# Patient Record
Sex: FEMALE | Race: WHITE | NOT HISPANIC OR LATINO | Employment: STUDENT | URBAN - METROPOLITAN AREA
[De-identification: names, ages, dates, MRNs, and addresses within clinical notes are randomized per-mention and may not be internally consistent; named-entity substitution may affect disease eponyms.]

---
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
## 2023-07-18 ENCOUNTER — APPOINTMENT (OUTPATIENT)
Dept: LAB | Facility: HOSPITAL | Age: 18
End: 2023-07-18
Payer: COMMERCIAL

## 2023-07-18 DIAGNOSIS — Z01.84 IMMUNITY STATUS TESTING: Primary | ICD-10-CM

## 2023-07-18 PROCEDURE — 87521 HEPATITIS C PROBE&RVRS TRNSC: CPT

## 2023-07-18 PROCEDURE — 86765 RUBEOLA ANTIBODY: CPT

## 2023-07-18 PROCEDURE — 86735 MUMPS ANTIBODY: CPT

## 2023-07-18 PROCEDURE — 86480 TB TEST CELL IMMUN MEASURE: CPT

## 2023-07-18 PROCEDURE — 86706 HEP B SURFACE ANTIBODY: CPT

## 2023-07-18 PROCEDURE — 36415 COLL VENOUS BLD VENIPUNCTURE: CPT

## 2023-07-18 PROCEDURE — 86762 RUBELLA ANTIBODY: CPT

## 2023-07-18 PROCEDURE — 86787 VARICELLA-ZOSTER ANTIBODY: CPT

## 2023-07-18 PROCEDURE — 87522 HEPATITIS C REVRS TRNSCRPJ: CPT

## 2023-07-19 LAB
HBV SURFACE AB SER-ACNC: <3 MIU/ML
HCV RNA SERPL NAA+PROBE-ACNC: NOT DETECTED K[IU]/ML
MEV IGG SER IA-ACNC: 43.4 AU/ML
MUV IGG SER IA-ACNC: 83.5 AU/ML
RUBV IGG SERPL IA-ACNC: 5.39 INDEX
VZV IGG SER QL IA: ABNORMAL

## 2023-07-20 LAB
GAMMA INTERFERON BACKGROUND BLD IA-ACNC: 0.03 IU/ML
M TB IFN-G BLD-IMP: NEGATIVE
M TB IFN-G CD4+ BCKGRND COR BLD-ACNC: 0 IU/ML
M TB IFN-G CD4+ BCKGRND COR BLD-ACNC: 0 IU/ML
MITOGEN IGNF BCKGRD COR BLD-ACNC: >10 IU/ML

## 2023-09-14 ENCOUNTER — APPOINTMENT (EMERGENCY)
Dept: CT IMAGING | Facility: HOSPITAL | Age: 18
End: 2023-09-14
Payer: COMMERCIAL

## 2023-09-14 ENCOUNTER — HOSPITAL ENCOUNTER (EMERGENCY)
Facility: HOSPITAL | Age: 18
Discharge: HOME | End: 2023-09-14
Attending: EMERGENCY MEDICINE
Payer: COMMERCIAL

## 2023-09-14 VITALS
WEIGHT: 121.03 LBS | RESPIRATION RATE: 20 BRPM | DIASTOLIC BLOOD PRESSURE: 65 MMHG | TEMPERATURE: 97.4 F | SYSTOLIC BLOOD PRESSURE: 111 MMHG | HEART RATE: 90 BPM | OXYGEN SATURATION: 100 %

## 2023-09-14 DIAGNOSIS — K59.00 CONSTIPATION: ICD-10-CM

## 2023-09-14 DIAGNOSIS — R10.9 FLANK PAIN: Primary | ICD-10-CM

## 2023-09-14 LAB
ALBUMIN SERPL BCP-MCNC: 4.5 G/DL (ref 3.5–5)
ALP SERPL-CCNC: 42 U/L (ref 34–104)
ALT SERPL W P-5'-P-CCNC: 12 U/L (ref 7–52)
ANION GAP SERPL CALCULATED.3IONS-SCNC: 9 MMOL/L
AST SERPL W P-5'-P-CCNC: 14 U/L (ref 13–39)
BASOPHILS # BLD AUTO: 0.03 THOUSANDS/ÂΜL (ref 0–0.1)
BASOPHILS NFR BLD AUTO: 0 % (ref 0–1)
BILIRUB SERPL-MCNC: 0.31 MG/DL (ref 0.2–1)
BILIRUB UR QL STRIP: NEGATIVE
BUN SERPL-MCNC: 9 MG/DL (ref 5–25)
CALCIUM SERPL-MCNC: 9.3 MG/DL (ref 8.4–10.2)
CHLORIDE SERPL-SCNC: 104 MMOL/L (ref 96–108)
CLARITY UR: CLEAR
CO2 SERPL-SCNC: 25 MMOL/L (ref 21–32)
COLOR UR: YELLOW
CREAT SERPL-MCNC: 0.69 MG/DL (ref 0.6–1.3)
EOSINOPHIL # BLD AUTO: 0.08 THOUSAND/ÂΜL (ref 0–0.61)
EOSINOPHIL NFR BLD AUTO: 1 % (ref 0–6)
ERYTHROCYTE [DISTWIDTH] IN BLOOD BY AUTOMATED COUNT: 11.9 % (ref 11.6–15.1)
EXT PREGNANCY TEST URINE: NEGATIVE
EXT. CONTROL: NORMAL
GFR SERPL CREATININE-BSD FRML MDRD: 125 ML/MIN/1.73SQ M
GLUCOSE SERPL-MCNC: 84 MG/DL (ref 65–140)
GLUCOSE UR STRIP-MCNC: NEGATIVE MG/DL
HCT VFR BLD AUTO: 42.1 % (ref 34.8–46.1)
HGB BLD-MCNC: 13.8 G/DL (ref 11.5–15.4)
HGB UR QL STRIP.AUTO: NEGATIVE
IMM GRANULOCYTES # BLD AUTO: 0.01 THOUSAND/UL (ref 0–0.2)
IMM GRANULOCYTES NFR BLD AUTO: 0 % (ref 0–2)
KETONES UR STRIP-MCNC: NEGATIVE MG/DL
LEUKOCYTE ESTERASE UR QL STRIP: NEGATIVE
LYMPHOCYTES # BLD AUTO: 3.36 THOUSANDS/ÂΜL (ref 0.6–4.47)
LYMPHOCYTES NFR BLD AUTO: 41 % (ref 14–44)
MCH RBC QN AUTO: 29.6 PG (ref 26.8–34.3)
MCHC RBC AUTO-ENTMCNC: 32.8 G/DL (ref 31.4–37.4)
MCV RBC AUTO: 90 FL (ref 82–98)
MONOCYTES # BLD AUTO: 0.49 THOUSAND/ÂΜL (ref 0.17–1.22)
MONOCYTES NFR BLD AUTO: 6 % (ref 4–12)
NEUTROPHILS # BLD AUTO: 4.21 THOUSANDS/ÂΜL (ref 1.85–7.62)
NEUTS SEG NFR BLD AUTO: 52 % (ref 43–75)
NITRITE UR QL STRIP: NEGATIVE
NRBC BLD AUTO-RTO: 0 /100 WBCS
PH UR STRIP.AUTO: 7 [PH] (ref 4.5–8)
PLATELET # BLD AUTO: 260 THOUSANDS/UL (ref 149–390)
PMV BLD AUTO: 9.1 FL (ref 8.9–12.7)
POTASSIUM SERPL-SCNC: 3.6 MMOL/L (ref 3.5–5.3)
PROT SERPL-MCNC: 8 G/DL (ref 6.4–8.4)
PROT UR STRIP-MCNC: NEGATIVE MG/DL
RBC # BLD AUTO: 4.67 MILLION/UL (ref 3.81–5.12)
SODIUM SERPL-SCNC: 138 MMOL/L (ref 135–147)
SP GR UR STRIP.AUTO: 1.02 (ref 1–1.03)
UROBILINOGEN UR QL STRIP.AUTO: 0.2 E.U./DL
WBC # BLD AUTO: 8.18 THOUSAND/UL (ref 4.31–10.16)

## 2023-09-14 PROCEDURE — 85025 COMPLETE CBC W/AUTO DIFF WBC: CPT | Performed by: EMERGENCY MEDICINE

## 2023-09-14 PROCEDURE — 81003 URINALYSIS AUTO W/O SCOPE: CPT

## 2023-09-14 PROCEDURE — 36415 COLL VENOUS BLD VENIPUNCTURE: CPT | Performed by: EMERGENCY MEDICINE

## 2023-09-14 PROCEDURE — 99284 EMERGENCY DEPT VISIT MOD MDM: CPT

## 2023-09-14 PROCEDURE — 96361 HYDRATE IV INFUSION ADD-ON: CPT

## 2023-09-14 PROCEDURE — 96375 TX/PRO/DX INJ NEW DRUG ADDON: CPT

## 2023-09-14 PROCEDURE — 80053 COMPREHEN METABOLIC PANEL: CPT | Performed by: EMERGENCY MEDICINE

## 2023-09-14 PROCEDURE — 99285 EMERGENCY DEPT VISIT HI MDM: CPT | Performed by: EMERGENCY MEDICINE

## 2023-09-14 PROCEDURE — 74176 CT ABD & PELVIS W/O CONTRAST: CPT

## 2023-09-14 PROCEDURE — 96374 THER/PROPH/DIAG INJ IV PUSH: CPT

## 2023-09-14 PROCEDURE — 81025 URINE PREGNANCY TEST: CPT | Performed by: EMERGENCY MEDICINE

## 2023-09-14 RX ORDER — KETOROLAC TROMETHAMINE 30 MG/ML
30 INJECTION, SOLUTION INTRAMUSCULAR; INTRAVENOUS ONCE
Status: COMPLETED | OUTPATIENT
Start: 2023-09-14 | End: 2023-09-14

## 2023-09-14 RX ORDER — DIPHENHYDRAMINE HYDROCHLORIDE 50 MG/ML
25 INJECTION INTRAMUSCULAR; INTRAVENOUS ONCE
Status: COMPLETED | OUTPATIENT
Start: 2023-09-14 | End: 2023-09-14

## 2023-09-14 RX ORDER — METOCLOPRAMIDE HYDROCHLORIDE 5 MG/ML
10 INJECTION INTRAMUSCULAR; INTRAVENOUS ONCE
Status: COMPLETED | OUTPATIENT
Start: 2023-09-14 | End: 2023-09-14

## 2023-09-14 RX ORDER — FENTANYL CITRATE 50 UG/ML
50 INJECTION, SOLUTION INTRAMUSCULAR; INTRAVENOUS ONCE
Status: COMPLETED | OUTPATIENT
Start: 2023-09-14 | End: 2023-09-14

## 2023-09-14 RX ORDER — POLYETHYLENE GLYCOL 3350 17 G/17G
17 POWDER, FOR SOLUTION ORAL DAILY
Qty: 24 EACH | Refills: 0 | Status: SHIPPED | OUTPATIENT
Start: 2023-09-14

## 2023-09-14 RX ORDER — DOCUSATE SODIUM 100 MG/1
100 CAPSULE, LIQUID FILLED ORAL EVERY 12 HOURS
Qty: 60 CAPSULE | Refills: 0 | Status: SHIPPED | OUTPATIENT
Start: 2023-09-14

## 2023-09-14 RX ORDER — ONDANSETRON 2 MG/ML
4 INJECTION INTRAMUSCULAR; INTRAVENOUS ONCE
Status: COMPLETED | OUTPATIENT
Start: 2023-09-14 | End: 2023-09-14

## 2023-09-14 RX ADMIN — METOCLOPRAMIDE 10 MG: 5 INJECTION, SOLUTION INTRAMUSCULAR; INTRAVENOUS at 22:33

## 2023-09-14 RX ADMIN — FENTANYL CITRATE 50 MCG: 0.05 INJECTION, SOLUTION INTRAMUSCULAR; INTRAVENOUS at 22:36

## 2023-09-14 RX ADMIN — SODIUM CHLORIDE 1000 ML: 0.9 INJECTION, SOLUTION INTRAVENOUS at 20:21

## 2023-09-14 RX ADMIN — ONDANSETRON 4 MG: 2 INJECTION INTRAMUSCULAR; INTRAVENOUS at 20:23

## 2023-09-14 RX ADMIN — SODIUM CHLORIDE 1000 ML: 0.9 INJECTION, SOLUTION INTRAVENOUS at 22:29

## 2023-09-14 RX ADMIN — DIPHENHYDRAMINE HYDROCHLORIDE 25 MG: 50 INJECTION, SOLUTION INTRAMUSCULAR; INTRAVENOUS at 22:32

## 2023-09-14 RX ADMIN — KETOROLAC TROMETHAMINE 30 MG: 30 INJECTION, SOLUTION INTRAMUSCULAR; INTRAVENOUS at 20:18

## 2023-09-14 NOTE — ED PROVIDER NOTES
Pt Name: Blue Womack  MRN: [id number]  [address] 2005  Age/Sex: 19 y.o. female  Date of evaluation: 9/14/2023  PCP: Felisha Bennett MD    [address]    Chief Complaint   Patient presents with   • Flank Pain     Pt reports "I believe I'm having a kidney stone. My family has a history of them. I'm having sharp shooting pain in my lower left abdomen that radiates to my side, nausea, no vomiting and dizziness." Pt denies urinary symptoms         HPI    [facility] presents to the Emergency Department complaining of severe left flank pain. She has a strong family hx of kidney stones and believes she has one. She has had nausea but no vomiting. HPI      Past Medical and Surgical History    History reviewed. No pertinent past medical history. History reviewed. No pertinent surgical history. History reviewed. No pertinent family history. Social History     Tobacco Use   • Smoking status: Never   • Smokeless tobacco: Never   Vaping Use   • Vaping Use: Never used   Substance Use Topics   • Alcohol use: Never   • Drug use: Never         . Allergies    No Known Allergies    Home Medications    Prior to Admission medications    Medication Sig Start Date End Date Taking? Authorizing Provider   ondansetron (ZOFRAN) 4 mg tablet Take 1 tablet (4 mg total) by mouth every 6 (six) hours 8/20/20   Karey Padilla MD           Review of Systems    Review of Systems   Constitutional: Negative for chills and fever. HENT: Negative for ear pain and sore throat. Eyes: Negative for pain and visual disturbance. Respiratory: Negative for cough and shortness of breath. Cardiovascular: Negative for chest pain and palpitations. Gastrointestinal: Positive for abdominal pain and nausea. Negative for vomiting. Genitourinary: Positive for flank pain. Negative for dysuria and hematuria. Musculoskeletal: Negative for arthralgias and back pain. Skin: Negative for color change and rash.    Neurological: Negative for seizures and syncope. All other systems reviewed and are negative. Physical Exam      ED Triage Vitals   Temperature Pulse Respirations Blood Pressure SpO2   09/14/23 1930 09/14/23 1932 09/14/23 1932 09/14/23 1932 09/14/23 1932   (!) 97.4 °F (36.3 °C) (!) 111 20 140/75 99 %      Temp Source Heart Rate Source Patient Position - Orthostatic VS BP Location FiO2 (%)   09/14/23 1930 09/14/23 1932 09/14/23 1932 09/14/23 1932 --   Oral Monitor Sitting Right arm       Pain Score       09/14/23 2018       8               Physical Exam  Vitals and nursing note reviewed. Constitutional:       General: She is not in acute distress. Appearance: She is well-developed. HENT:      Head: Normocephalic and atraumatic. Eyes:      Conjunctiva/sclera: Conjunctivae normal.   Cardiovascular:      Rate and Rhythm: Normal rate and regular rhythm. Heart sounds: No murmur heard. Pulmonary:      Effort: Pulmonary effort is normal. No respiratory distress. Breath sounds: Normal breath sounds. Abdominal:      Palpations: Abdomen is soft. Tenderness: There is abdominal tenderness in the left lower quadrant. There is left CVA tenderness. Musculoskeletal:         General: No swelling. Cervical back: Neck supple. Skin:     General: Skin is warm and dry. Capillary Refill: Capillary refill takes less than 2 seconds. Neurological:      Mental Status: She is alert. Psychiatric:         Mood and Affect: Mood normal.       Assessment and Kuldipbetsy Resendiz is a 19 y.o. female who presents with left lower abdominal and flank pain with nausea. Physical examination remarkable for LLQ tenderness and left CVA tenderness. Differential diagnosis (not completely inclusive) includes renal colic, pyelo, diverticulitis, other. Plan will be to perform diagnostic testing and treat symptomatically.       MDM    Diagnostic Results      Labs:    Results for orders placed or performed during the hospital encounter of 09/14/23   CBC and differential   Result Value Ref Range    WBC 8.18 4.31 - 10.16 Thousand/uL    RBC 4.67 3.81 - 5.12 Million/uL    Hemoglobin 13.8 11.5 - 15.4 g/dL    Hematocrit 42.1 34.8 - 46.1 %    MCV 90 82 - 98 fL    MCH 29.6 26.8 - 34.3 pg    MCHC 32.8 31.4 - 37.4 g/dL    RDW 11.9 11.6 - 15.1 %    MPV 9.1 8.9 - 12.7 fL    Platelets 490 304 - 299 Thousands/uL    nRBC 0 /100 WBCs    Neutrophils Relative 52 43 - 75 %    Immat GRANS % 0 0 - 2 %    Lymphocytes Relative 41 14 - 44 %    Monocytes Relative 6 4 - 12 %    Eosinophils Relative 1 0 - 6 %    Basophils Relative 0 0 - 1 %    Neutrophils Absolute 4.21 1.85 - 7.62 Thousands/µL    Immature Grans Absolute 0.01 0.00 - 0.20 Thousand/uL    Lymphocytes Absolute 3.36 0.60 - 4.47 Thousands/µL    Monocytes Absolute 0.49 0.17 - 1.22 Thousand/µL    Eosinophils Absolute 0.08 0.00 - 0.61 Thousand/µL    Basophils Absolute 0.03 0.00 - 0.10 Thousands/µL   Comprehensive metabolic panel   Result Value Ref Range    Sodium 138 135 - 147 mmol/L    Potassium 3.6 3.5 - 5.3 mmol/L    Chloride 104 96 - 108 mmol/L    CO2 25 21 - 32 mmol/L    ANION GAP 9 mmol/L    BUN 9 5 - 25 mg/dL    Creatinine 0.69 0.60 - 1.30 mg/dL    Glucose 84 65 - 140 mg/dL    Calcium 9.3 8.4 - 10.2 mg/dL    AST 14 13 - 39 U/L    ALT 12 7 - 52 U/L    Alkaline Phosphatase 42 34 - 104 U/L    Total Protein 8.0 6.4 - 8.4 g/dL    Albumin 4.5 3.5 - 5.0 g/dL    Total Bilirubin 0.31 0.20 - 1.00 mg/dL    eGFR 125 ml/min/1.73sq m   POCT pregnancy, urine   Result Value Ref Range    EXT Preg Test, Ur Negative     Control Valid    Urine Macroscopic, POC   Result Value Ref Range    Color, UA Yellow     Clarity, UA Clear     pH, UA 7.0 4.5 - 8.0    Leukocytes, UA Negative Negative    Nitrite, UA Negative Negative    Protein, UA Negative Negative mg/dl    Glucose, UA Negative Negative mg/dl    Ketones, UA Negative Negative mg/dl    Urobilinogen, UA 0.2 0.2, 1.0 E.U./dl E.U./dl    Bilirubin, UA Negative Negative    Occult Blood, UA Negative Negative    Specific Gravity, UA 1.025 1.003 - 1.030       All labs reviewed and utilized in the medical decision making process    Radiology:    CT renal stone study abdomen pelvis wo contrast   Final Result         1. No evidence of nephrolithiasis or obstructive uropathy. 2. Findings suggestive of constipation. No evidence of bowel obstruction, colitis, diverticulitis or appendicitis            Workstation performed: [id number]             All radiology studies independently viewed by me and interpreted by the radiologist.    Procedure    Procedures      ED Course of Care and Re-Assessments    Patient was feeling significantly better at the time of discharge.         Medications   ketorolac (TORADOL) injection 30 mg (30 mg Intravenous Given 9/14/23 2018)   ondansetron (ZOFRAN) injection 4 mg (4 mg Intravenous Given 9/14/23 2023)   sodium chloride 0.9 % bolus 1,000 mL (0 mL Intravenous Stopped 9/14/23 2121)   metoclopramide (REGLAN) injection 10 mg (10 mg Intravenous Given 9/14/23 2233)   diphenhydrAMINE (BENADRYL) injection 25 mg (25 mg Intravenous Given 9/14/23 2232)   sodium chloride 0.9 % bolus 1,000 mL (0 mL Intravenous Stopped 9/14/23 2259)   fentanyl citrate (PF) 100 MCG/2ML 50 mcg (50 mcg Intravenous Given 9/14/23 2236)           FINAL IMPRESSION    Final diagnoses:   Flank pain   Constipation         DISPOSITION/PLAN    Time reflects when diagnosis was documented in both MDM as applicable and the Disposition within this note     Time User Action Codes Description Comment    9/14/2023 10:22 PM Sumit Norman L Add [R10.9] Flank pain     9/14/2023 10:22 PM Sumit Norman L Add [N20.0] Kidney stone     9/14/2023 11:04 PM Sumit Norman L Remove [N20.0] Kidney stone     9/14/2023 11:04 PM Sumit Norman Add [K59.00] Constipation       ED Disposition     ED Disposition   Discharge    Condition   Stable    Date/Time   Thu Sep 14, 2023 10:22 PM    Comment Yeny Bobo discharge to home/self care. Follow-up Information     Follow up With Specialties Details Why Contact Info Additional Information    Zuleyma Hurley MD Internal Medicine Schedule an appointment as soon as possible for a visit   [facility] [place] [address] [facility]. [address]  [address] [place] [address]  [phone]       [facility] Urology Schedule an appointment as soon as possible for a visit   [facility] [address] [id number] [address] [place] [facility], [address] [place] [address], [id number] [phone]            PATIENT REFERRED TO:    Zuleyma Hurley MD  [address] [place] [address]. [address]  [address] [facility] [address] [phone]    Schedule an appointment as soon as possible for a visit       [facility] [address] [address] [phone]  Schedule an appointment as soon as possible for a visit         DISCHARGE MEDICATIONS:    Discharge Medication List as of 9/14/2023 11:05 PM      START taking these medications    Details   docusate sodium (COLACE) 100 mg capsule Take 1 capsule (100 mg total) by mouth every 12 (twelve) hours, Starting Thu 9/14/2023, Normal      polyethylene glycol (MIRALAX) 17 g packet Take 17 g by mouth daily, Starting Thu 9/14/2023, Normal         CONTINUE these medications which have NOT CHANGED    Details   ondansetron (ZOFRAN) 4 mg tablet Take 1 tablet (4 mg total) by mouth every 6 (six) hours, Starting Thu 8/20/2020, Normal             No discharge procedures on file.          Ida Cuevas, [address],   09/15/23 2606

## 2024-02-21 PROBLEM — S09.90XA MILD HEAD INJURY DUE TO MOTOR VEHICLE ACCIDENT: Status: RESOLVED | Noted: 2020-08-20 | Resolved: 2024-02-21
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
## 2024-02-26 PROCEDURE — G0145 SCR C/V CYTO,THINLAYER,RESCR: HCPCS | Performed by: OBSTETRICS & GYNECOLOGY

## 2024-02-27 ENCOUNTER — LAB REQUISITION (OUTPATIENT)
Dept: LAB | Facility: HOSPITAL | Age: 19
End: 2024-02-27
Payer: COMMERCIAL

## 2024-02-27 DIAGNOSIS — Z01.419 ENCOUNTER FOR GYNECOLOGICAL EXAMINATION (GENERAL) (ROUTINE) WITHOUT ABNORMAL FINDINGS: ICD-10-CM

## 2024-02-27 DIAGNOSIS — Z12.4 ENCOUNTER FOR SCREENING FOR MALIGNANT NEOPLASM OF CERVIX: ICD-10-CM

## 2024-02-27 DIAGNOSIS — Z12.72 ENCOUNTER FOR SCREENING FOR MALIGNANT NEOPLASM OF VAGINA: ICD-10-CM

## 2024-03-01 LAB
LAB AP GYN PRIMARY INTERPRETATION: NORMAL
LAB AP LMP: NORMAL
Lab: NORMAL

## 2024-03-21 ENCOUNTER — OFFICE VISIT (OUTPATIENT)
Dept: INTERNAL MEDICINE CLINIC | Facility: OTHER | Age: 19
End: 2024-03-21
Payer: COMMERCIAL

## 2024-03-21 VITALS
OXYGEN SATURATION: 98 % | TEMPERATURE: 98.1 F | SYSTOLIC BLOOD PRESSURE: 108 MMHG | WEIGHT: 120 LBS | HEART RATE: 72 BPM | DIASTOLIC BLOOD PRESSURE: 64 MMHG | HEIGHT: 63 IN | BODY MASS INDEX: 21.26 KG/M2

## 2024-03-21 DIAGNOSIS — J45.20 MILD INTERMITTENT ASTHMA WITHOUT COMPLICATION: ICD-10-CM

## 2024-03-21 DIAGNOSIS — G43.009 MIGRAINE WITHOUT AURA AND WITHOUT STATUS MIGRAINOSUS, NOT INTRACTABLE: ICD-10-CM

## 2024-03-21 DIAGNOSIS — G89.29 CHRONIC NONINTRACTABLE HEADACHE, UNSPECIFIED HEADACHE TYPE: ICD-10-CM

## 2024-03-21 DIAGNOSIS — F41.9 ANXIETY: ICD-10-CM

## 2024-03-21 DIAGNOSIS — H61.23 IMPACTED CERUMEN OF BOTH EARS: ICD-10-CM

## 2024-03-21 DIAGNOSIS — R51.9 CHRONIC NONINTRACTABLE HEADACHE, UNSPECIFIED HEADACHE TYPE: ICD-10-CM

## 2024-03-21 DIAGNOSIS — Z13.29 SCREENING FOR THYROID DISORDER: ICD-10-CM

## 2024-03-21 DIAGNOSIS — L50.2 HIVES DUE TO HEAT EXPOSURE: Primary | ICD-10-CM

## 2024-03-21 DIAGNOSIS — Z13.0 SCREENING, ANEMIA, DEFICIENCY, IRON: ICD-10-CM

## 2024-03-21 DIAGNOSIS — Z76.89 ENCOUNTER TO ESTABLISH CARE: ICD-10-CM

## 2024-03-21 DIAGNOSIS — Z13.220 SCREENING FOR LIPID DISORDERS: ICD-10-CM

## 2024-03-21 DIAGNOSIS — Z13.1 SCREENING FOR DIABETES MELLITUS: ICD-10-CM

## 2024-03-21 PROBLEM — M25.569 KNEE PAIN: Status: ACTIVE | Noted: 2024-03-21

## 2024-03-21 PROBLEM — J45.909 ASTHMA: Status: ACTIVE | Noted: 2024-03-21

## 2024-03-21 PROCEDURE — 99204 OFFICE O/P NEW MOD 45 MIN: CPT | Performed by: NURSE PRACTITIONER

## 2024-03-21 RX ORDER — ALBUTEROL SULFATE 90 UG/1
2 AEROSOL, METERED RESPIRATORY (INHALATION) EVERY 6 HOURS PRN
COMMUNITY

## 2024-03-21 RX ORDER — NORETHINDRONE ACETATE AND ETHINYL ESTRADIOL 1; 20 MG/1; UG/1
1 TABLET ORAL DAILY
COMMUNITY
Start: 2024-03-05

## 2024-03-21 NOTE — PROGRESS NOTES
Assessment/Plan:    Problem List Items Addressed This Visit       Hives due to heat exposure - Primary     - start claritin 10mg daily  - follow up with allergist          Relevant Orders    Ambulatory Referral to Allergy    Asthma     - controlled with albuterol prn. Uses 15 min prior to exercise         Relevant Medications    albuterol (PROVENTIL HFA,VENTOLIN HFA) 90 mcg/act inhaler    Anxiety     - previously on lexapro and buspar. Currently controlled without medication          Migraine without aura and without status migrainosus, not intractable     - occurring about once a week  - no hx of prescription medication. Continue tylenol/ibuprofen prn  - keep headache diary.   - drink 60-80oz of water a day  - check labs as ordered  - follow up 1 month         Chronic nonintractable headache     - reports daily headaches for as long as she can remember  - keep headache diary  - recommend clean, healthy diet. Avoid processed foods, artificial sweeteners etc.  - increase water to 60-80oz daily  - check labs as ordered  - follow up 1 month         Encounter to establish care     Medical hx reviewed with patient  See assessment/plan  Check labs as ordered  Follow up 1 month          Other Visit Diagnoses       Screening, anemia, deficiency, iron        Relevant Orders    CBC and differential    Screening for diabetes mellitus        Relevant Orders    Comprehensive metabolic panel    Screening for lipid disorders        Relevant Orders    Lipid Panel with Direct LDL reflex    Screening for thyroid disorder        Relevant Orders    TSH, 3rd generation with Free T4 reflex    Impacted cerumen of both ears        start debrox  follow up for lavage            BMI Counseling: Body mass index is 21.26 kg/m².          M*MeetDoctor software was used to dictate this note.  It may contain errors with dictating incorrect words or incorrect spelling. Please contact the provider directly with any questions.    Subjective:      Patient  ID: Anjelica Berger is a 19 y.o. female.    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 She was on lexapro 10mg and buspar 15mg. She stopped these medications about 9 months ago. She feels her anxiety is controlled for the most part, she has occasional flares of anxiety.     Asthma - exercise induced asthma, diagnosed in 2022 by her family doctor. She was on albuterol as needed. She uses her albuterol 15 minutes before exercise. She does note she has infrequent asthma attacks at other times as well, heart racing, chest tightness, SOB. The albuterol improves her symptoms.   She estimates this happens every other month.     Migraines - she has been having migraines in her carter yr of highschool. She has migraines about once a week, depending on her stress. She was never on a prescription medication for migraines    Headaches - she reports daily headaches in the front and back of her head. She states she has had them for as long as she can remember. Feels like a pressure, other times pounding. Occasional nausea and dizziness. She has symptoms all day. She will use tylenol which does alleviate the headaches.     Heat hives - she develops hives when she gets hot. Anytime she feels herself getting hot she will start to get red and blotchy and as she has gotten older she will now get full blown hives. She gets it when she exercises.     She has a hx of a kidney stone. She thinks she has passed it.     Knee pain - worse when going up stairs. She has swelling in her left knee after doing the stair master.  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   She usually exercises 3-4 times a week.       The following portions of the patient's history were reviewed and updated as appropriate: allergies, current  "medications, past family history, past medical history, past social history, past surgical history, and problem list.    Review of Systems   Constitutional:  Negative for activity change, appetite change, chills and fever.   HENT:  Negative for congestion, ear pain, postnasal drip, rhinorrhea, sinus pressure, sinus pain, sneezing and sore throat.    Eyes:  Negative for discharge and itching.   Respiratory:  Negative for cough, chest tightness, shortness of breath and wheezing.    Endocrine: Positive for heat intolerance.   Musculoskeletal:  Positive for arthralgias (left knee).   Skin:         hives   Neurological:  Positive for dizziness and headaches.         Past Medical History:   Diagnosis Date    Allergic     Seasonal    Anxiety     Asthma          Current Outpatient Medications:     albuterol (PROVENTIL HFA,VENTOLIN HFA) 90 mcg/act inhaler, Inhale 2 puffs every 6 (six) hours as needed for wheezing, Disp: , Rfl:     Junel 1/20 1-20 MG-MCG per tablet, Take 1 tablet by mouth daily, Disp: , Rfl:     No Known Allergies    Social History   History reviewed. No pertinent surgical history.  Family History   Problem Relation Age of Onset    Arthritis Mother         Diagnosed at 20    Migraines Mother     Hyperparathyroidism Father     Nephrolithiasis Father     Asthma Maternal Grandmother     Breast cancer Maternal Grandmother     Asthma Paternal Grandmother     Autoimmune disease Paternal Grandmother     Sjogren's syndrome Paternal Grandmother     Diabetes Paternal Aunt         Pre-diabetic       Objective:  /64 (BP Location: Left arm, Patient Position: Sitting, Cuff Size: Standard)   Pulse 72   Temp 98.1 °F (36.7 °C) (Temporal)   Ht 5' 3\" (1.6 m)   Wt 54.4 kg (120 lb)   SpO2 98%   BMI 21.26 kg/m²      Physical Exam  Vitals reviewed.   Constitutional:       General: She is not in acute distress.     Appearance: Normal appearance. She is normal weight. She is not diaphoretic.   HENT:      Head: " Normocephalic and atraumatic.      Right Ear: Tympanic membrane and external ear normal. There is impacted cerumen.      Left Ear: Tympanic membrane and external ear normal. There is impacted cerumen.      Nose: Nose normal.      Mouth/Throat:      Mouth: Mucous membranes are moist.      Pharynx: Oropharynx is clear. No oropharyngeal exudate or posterior oropharyngeal erythema.   Eyes:      Extraocular Movements: Extraocular movements intact.      Conjunctiva/sclera: Conjunctivae normal.      Pupils: Pupils are equal, round, and reactive to light.   Cardiovascular:      Rate and Rhythm: Normal rate and regular rhythm.      Heart sounds: Normal heart sounds. No murmur heard.  Pulmonary:      Effort: Pulmonary effort is normal. No respiratory distress.      Breath sounds: Normal breath sounds. No wheezing, rhonchi or rales.   Lymphadenopathy:      Upper Body:      Right upper body: No supraclavicular, axillary, pectoral or epitrochlear adenopathy.      Left upper body: No supraclavicular, axillary, pectoral or epitrochlear adenopathy.   Skin:     Findings: Rash (flat red blotches on neck and chest during visit) present.   Neurological:      Mental Status: She is alert and oriented to person, place, and time. Mental status is at baseline.   Psychiatric:         Mood and Affect: Mood normal.         Behavior: Behavior normal.         Thought Content: Thought content normal.         Judgment: Judgment normal.

## 2024-03-21 NOTE — ASSESSMENT & PLAN NOTE
- occurring about once a week  - no hx of prescription medication. Continue tylenol/ibuprofen prn  - keep headache diary.   - drink 60-80oz of water a day  - check labs as ordered  - follow up 1 month

## 2024-03-21 NOTE — ASSESSMENT & PLAN NOTE
- reports daily headaches for as long as she can remember  - keep headache diary  - recommend clean, healthy diet. Avoid processed foods, artificial sweeteners etc.  - increase water to 60-80oz daily  - check labs as ordered  - follow up 1 month

## 2024-03-21 NOTE — PATIENT INSTRUCTIONS
Start claritin 10mg daily     Go for labs fasting     Schedule appt with allergist    Start debrox drops into each ear, 5 drops per ear for 5 days before next visit     Drink 60-90 oz of water daily  Monitor diet.

## 2024-03-25 ENCOUNTER — PATIENT MESSAGE (OUTPATIENT)
Dept: INTERNAL MEDICINE CLINIC | Facility: OTHER | Age: 19
End: 2024-03-25

## 2024-03-26 ENCOUNTER — APPOINTMENT (OUTPATIENT)
Dept: LAB | Age: 19
End: 2024-03-26
Payer: COMMERCIAL

## 2024-03-26 DIAGNOSIS — Z13.0 SCREENING, ANEMIA, DEFICIENCY, IRON: ICD-10-CM

## 2024-03-26 DIAGNOSIS — Z13.220 SCREENING FOR LIPID DISORDERS: ICD-10-CM

## 2024-03-26 DIAGNOSIS — Z13.29 SCREENING FOR THYROID DISORDER: ICD-10-CM

## 2024-03-26 DIAGNOSIS — Z13.1 SCREENING FOR DIABETES MELLITUS: ICD-10-CM

## 2024-03-26 LAB
ALBUMIN SERPL BCP-MCNC: 4.2 G/DL (ref 3.5–5)
ALP SERPL-CCNC: 39 U/L (ref 34–104)
ALT SERPL W P-5'-P-CCNC: 10 U/L (ref 7–52)
ANION GAP SERPL CALCULATED.3IONS-SCNC: 8 MMOL/L (ref 4–13)
AST SERPL W P-5'-P-CCNC: 16 U/L (ref 13–39)
BASOPHILS # BLD AUTO: 0.01 THOUSANDS/ÂΜL (ref 0–0.1)
BASOPHILS NFR BLD AUTO: 0 % (ref 0–1)
BILIRUB SERPL-MCNC: 0.42 MG/DL (ref 0.2–1)
BUN SERPL-MCNC: 8 MG/DL (ref 5–25)
CALCIUM SERPL-MCNC: 9.1 MG/DL (ref 8.4–10.2)
CHLORIDE SERPL-SCNC: 104 MMOL/L (ref 96–108)
CHOLEST SERPL-MCNC: 169 MG/DL
CO2 SERPL-SCNC: 27 MMOL/L (ref 21–32)
CREAT SERPL-MCNC: 0.75 MG/DL (ref 0.6–1.3)
EOSINOPHIL # BLD AUTO: 0.04 THOUSAND/ÂΜL (ref 0–0.61)
EOSINOPHIL NFR BLD AUTO: 1 % (ref 0–6)
ERYTHROCYTE [DISTWIDTH] IN BLOOD BY AUTOMATED COUNT: 11.9 % (ref 11.6–15.1)
GFR SERPL CREATININE-BSD FRML MDRD: 114 ML/MIN/1.73SQ M
GLUCOSE P FAST SERPL-MCNC: 75 MG/DL (ref 65–99)
HCT VFR BLD AUTO: 41 % (ref 34.8–46.1)
HDLC SERPL-MCNC: 59 MG/DL
HGB BLD-MCNC: 13.3 G/DL (ref 11.5–15.4)
IMM GRANULOCYTES # BLD AUTO: 0 THOUSAND/UL (ref 0–0.2)
IMM GRANULOCYTES NFR BLD AUTO: 0 % (ref 0–2)
LDLC SERPL CALC-MCNC: 93 MG/DL (ref 0–100)
LYMPHOCYTES # BLD AUTO: 2.19 THOUSANDS/ÂΜL (ref 0.6–4.47)
LYMPHOCYTES NFR BLD AUTO: 44 % (ref 14–44)
MCH RBC QN AUTO: 29.6 PG (ref 26.8–34.3)
MCHC RBC AUTO-ENTMCNC: 32.4 G/DL (ref 31.4–37.4)
MCV RBC AUTO: 91 FL (ref 82–98)
MONOCYTES # BLD AUTO: 0.4 THOUSAND/ÂΜL (ref 0.17–1.22)
MONOCYTES NFR BLD AUTO: 8 % (ref 4–12)
NEUTROPHILS # BLD AUTO: 2.38 THOUSANDS/ÂΜL (ref 1.85–7.62)
NEUTS SEG NFR BLD AUTO: 47 % (ref 43–75)
NRBC BLD AUTO-RTO: 0 /100 WBCS
PLATELET # BLD AUTO: 216 THOUSANDS/UL (ref 149–390)
PMV BLD AUTO: 9.7 FL (ref 8.9–12.7)
POTASSIUM SERPL-SCNC: 3.8 MMOL/L (ref 3.5–5.3)
PROT SERPL-MCNC: 6.9 G/DL (ref 6.4–8.4)
RBC # BLD AUTO: 4.5 MILLION/UL (ref 3.81–5.12)
SODIUM SERPL-SCNC: 139 MMOL/L (ref 135–147)
TRIGL SERPL-MCNC: 86 MG/DL
TSH SERPL DL<=0.05 MIU/L-ACNC: 1.62 UIU/ML (ref 0.45–4.5)
WBC # BLD AUTO: 5.02 THOUSAND/UL (ref 4.31–10.16)

## 2024-03-26 PROCEDURE — 36415 COLL VENOUS BLD VENIPUNCTURE: CPT

## 2024-03-26 PROCEDURE — 84443 ASSAY THYROID STIM HORMONE: CPT

## 2024-03-26 PROCEDURE — 85025 COMPLETE CBC W/AUTO DIFF WBC: CPT

## 2024-03-26 PROCEDURE — 80061 LIPID PANEL: CPT

## 2024-03-26 PROCEDURE — 80053 COMPREHEN METABOLIC PANEL: CPT

## 2024-04-10 ENCOUNTER — RA CDI HCC (OUTPATIENT)
Dept: OTHER | Facility: HOSPITAL | Age: 19
End: 2024-04-10

## 2024-04-19 ENCOUNTER — OFFICE VISIT (OUTPATIENT)
Dept: INTERNAL MEDICINE CLINIC | Facility: OTHER | Age: 19
End: 2024-04-19
Payer: COMMERCIAL

## 2024-04-19 VITALS
HEIGHT: 63 IN | DIASTOLIC BLOOD PRESSURE: 70 MMHG | HEART RATE: 103 BPM | OXYGEN SATURATION: 99 % | BODY MASS INDEX: 23 KG/M2 | SYSTOLIC BLOOD PRESSURE: 118 MMHG | TEMPERATURE: 98.8 F | WEIGHT: 129.8 LBS

## 2024-04-19 DIAGNOSIS — F41.9 ANXIETY: Primary | ICD-10-CM

## 2024-04-19 DIAGNOSIS — L50.2 HIVES DUE TO HEAT EXPOSURE: ICD-10-CM

## 2024-04-19 DIAGNOSIS — F41.0 PANIC ATTACKS: ICD-10-CM

## 2024-04-19 DIAGNOSIS — G43.009 MIGRAINE WITHOUT AURA AND WITHOUT STATUS MIGRAINOSUS, NOT INTRACTABLE: ICD-10-CM

## 2024-04-19 PROBLEM — Z76.89 ENCOUNTER TO ESTABLISH CARE: Status: RESOLVED | Noted: 2024-03-21 | Resolved: 2024-04-19

## 2024-04-19 PROCEDURE — 99214 OFFICE O/P EST MOD 30 MIN: CPT | Performed by: NURSE PRACTITIONER

## 2024-04-19 RX ORDER — METHYLPREDNISOLONE 4 MG/1
TABLET ORAL
Qty: 21 EACH | Refills: 0 | Status: SHIPPED | OUTPATIENT
Start: 2024-04-19

## 2024-04-19 RX ORDER — ZOLMITRIPTAN 2.5 MG/1
2.5 TABLET, FILM COATED ORAL ONCE AS NEEDED
Qty: 10 TABLET | Refills: 1 | Status: SHIPPED | OUTPATIENT
Start: 2024-04-19

## 2024-04-19 RX ORDER — HYDROXYZINE HYDROCHLORIDE 10 MG/1
10 TABLET, FILM COATED ORAL EVERY 6 HOURS PRN
Qty: 30 TABLET | Refills: 1 | Status: SHIPPED | OUTPATIENT
Start: 2024-04-19

## 2024-04-19 RX ORDER — VENLAFAXINE HYDROCHLORIDE 37.5 MG/1
37.5 CAPSULE, EXTENDED RELEASE ORAL DAILY
Qty: 30 CAPSULE | Refills: 1 | Status: SHIPPED | OUTPATIENT
Start: 2024-04-19

## 2024-04-19 RX ORDER — LORATADINE 10 MG/1
TABLET ORAL
COMMUNITY
Start: 2024-03-21

## 2024-04-19 NOTE — PATIENT INSTRUCTIONS
Start medrol dose chaparro and take as directed   After completed, then start effexor once daily

## 2024-04-19 NOTE — ASSESSMENT & PLAN NOTE
- start hydroxyzine 10mg prn  - she was previously on hydroxyzine 25mg which worked well for her panic, but made her extremely tired.

## 2024-04-19 NOTE — ASSESSMENT & PLAN NOTE
- start medrol dose chaparro for acute migraine   - once completed, start Effexor XR 37.5mg daily   - zomig 2.5mg prn at onset of migraines  - follow up in 6 weeks

## 2024-04-19 NOTE — PROGRESS NOTES
Assessment/Plan:    Problem List Items Addressed This Visit       Hives due to heat exposure     - improved with claritin 10mg daily  - scheduled with allergist on 5/6         Anxiety - Primary     - start Effexor 37.5mg daily  - hydroxyzine 10mg prn   - follow up 6 weeks         Relevant Medications    venlafaxine (EFFEXOR-XR) 37.5 mg 24 hr capsule    Migraine without aura and without status migrainosus, not intractable     - start medrol dose chaparro for acute migraine   - once completed, start Effexor XR 37.5mg daily   - zomig 2.5mg prn at onset of migraines  - follow up in 6 weeks          Relevant Medications    venlafaxine (EFFEXOR-XR) 37.5 mg 24 hr capsule    ZOLMitriptan (ZOMIG) 2.5 MG tablet    methylPREDNISolone 4 MG tablet therapy pack    Panic attacks     - start hydroxyzine 10mg prn  - she was previously on hydroxyzine 25mg which worked well for her panic, but made her extremely tired.          Relevant Medications    hydrOXYzine HCL (ATARAX) 10 mg tablet       BMI Counseling: Body mass index is 22.99 kg/m².        [employer] software was used to dictate this note.  It may contain errors with dictating incorrect words or incorrect spelling. Please contact the provider directly with any questions.    Subjective:      Patient ID: Anjelica Berger is a 19 y.o. female.    HPI    Patient presents today for routine 1 month follow up    Hives - she is scheduled with an allergist on 5/6. She states that since starting the Claritin she has not had the hives, but she does continue to have red spots on her skin. She also feels she has been sweating more.     Migraines - she has chronic daily headaches and also have migraines. Since our last visit her symptoms have been unchanged.   This week (2 days ago) she was at work and started with hot flashes and mild dizziness. They checked her BS which was normal 89. Then her BP was elevated 146/93. She then started to develop left sided facial pain, nausea, room started spinning.  She felt tunnel vision starting and then blacked out. She thinks she may have lost consciousness for brief period. She denies any head trauma. She was put in a wheelchair. They put ice on the back of her neck and gave her water. She went home and went to sleep. She slept for about 4 hours and when she woke up she did feel out of it. The head pain was improved, nausea was improved. Yesterday at work she had some nausea. She continues with a headache on the left side, constant about 6/10. Gradually improving throughout the week. Vision is normal. Nausea is improved.     She states she get migraines a lot with changes in pressure in the weather.     Anxiety- she is having anxiety on a daily basis. With her anxiety she usually has nausea or diarrhea. Work has been more stressful lately.   She was previously lexapro and buspirone. She felt these worked well for her.   She was also on hydroxyzine prn panic attacks.     The following portions of the patient's history were reviewed and updated as appropriate: allergies, current medications, past family history, past medical history, past social history, past surgical history, and problem list.    Review of Systems   Eyes:  Positive for visual disturbance (resolved).   Respiratory:  Negative for cough, chest tightness, shortness of breath and wheezing.    Gastrointestinal:  Positive for nausea (resolved).   Neurological:  Positive for dizziness (resolved) and headaches.   Psychiatric/Behavioral:  The patient is nervous/anxious.    All other systems reviewed and are negative.        Past Medical History:   Diagnosis Date    Allergic     Seasonal    Anxiety     Asthma          Current Outpatient Medications:     albuterol (PROVENTIL HFA,VENTOLIN HFA) 90 mcg/act inhaler, Inhale 2 puffs every 6 (six) hours as needed for wheezing, Disp: , Rfl:     hydrOXYzine HCL (ATARAX) 10 mg tablet, Take 1 tablet (10 mg total) by mouth every 6 (six) hours as needed for anxiety, Disp: 30  "tablet, Rfl: 1    Junel 1/20 1-20 MG-MCG per tablet, Take 1 tablet by mouth daily, Disp: , Rfl:     loratadine (Claritin) 10 mg tablet, , Disp: , Rfl:     methylPREDNISolone 4 MG tablet therapy pack, Use as directed on package, Disp: 21 each, Rfl: 0    venlafaxine (EFFEXOR-XR) 37.5 mg 24 hr capsule, Take 1 capsule (37.5 mg total) by mouth daily, Disp: 30 capsule, Rfl: 1    ZOLMitriptan (ZOMIG) 2.5 MG tablet, Take 1 tablet (2.5 mg total) by mouth once as needed for migraine may repeat after 2 hours, max 5mg per 24 hours, Disp: 10 tablet, Rfl: 1    No Known Allergies    Social History   History reviewed. No pertinent surgical history.  Family History   Problem Relation Age of Onset    Arthritis Mother         Diagnosed at 20    Migraines Mother     Hyperparathyroidism Father     Nephrolithiasis Father     Asthma Maternal Grandmother     Breast cancer Maternal Grandmother     Sjogren's syndrome Maternal Grandmother     Von Willebrand disease Maternal Grandmother     Asthma Paternal Grandmother     Autoimmune disease Paternal Grandmother     Sjogren's syndrome Paternal Grandmother     Fibromyalgia Paternal Grandmother     Diabetes Paternal Aunt         Pre-diabetic       Objective:  /70 (BP Location: Left arm, Patient Position: Sitting, Cuff Size: Adult)   Pulse 103   Temp 98.8 °F (37.1 °C) (Temporal)   Ht 5' 3\" (1.6 m)   Wt 58.9 kg (129 lb 12.8 oz) Comment: with shoes on  SpO2 99% Comment: ra  BMI 22.99 kg/m²      Physical Exam  Vitals reviewed.   Constitutional:       General: She is not in acute distress.     Appearance: Normal appearance. She is well-developed and normal weight. She is not diaphoretic.   HENT:      Head: Normocephalic and atraumatic.   Eyes:      Extraocular Movements: Extraocular movements intact.      Conjunctiva/sclera: Conjunctivae normal.      Pupils: Pupils are equal, round, and reactive to light.   Cardiovascular:      Rate and Rhythm: Normal rate and regular rhythm.      Heart " sounds: Normal heart sounds. No murmur heard.  Pulmonary:      Effort: Pulmonary effort is normal. No respiratory distress.      Breath sounds: Normal breath sounds. No decreased breath sounds, wheezing or rhonchi.   Musculoskeletal:      Cervical back: Normal range of motion and neck supple.   Lymphadenopathy:      Cervical: No cervical adenopathy.   Skin:     General: Skin is warm and dry.   Neurological:      Mental Status: She is alert and oriented to person, place, and time. Mental status is at baseline.      Cranial Nerves: No cranial nerve deficit, dysarthria or facial asymmetry.      Motor: No weakness, abnormal muscle tone or pronator drift.      Coordination: Coordination is intact. Romberg sign negative. Coordination normal. Finger-Nose-Finger Test and Heel to Shin Test normal. Rapid alternating movements normal.      Gait: Gait is intact. Gait and tandem walk normal.   Psychiatric:         Mood and Affect: Mood normal.         Behavior: Behavior normal.         Thought Content: Thought content normal.         Judgment: Judgment normal.

## 2024-05-11 DIAGNOSIS — F41.9 ANXIETY: ICD-10-CM

## 2024-05-11 DIAGNOSIS — G43.009 MIGRAINE WITHOUT AURA AND WITHOUT STATUS MIGRAINOSUS, NOT INTRACTABLE: ICD-10-CM

## 2024-05-12 RX ORDER — VENLAFAXINE HYDROCHLORIDE 37.5 MG/1
37.5 CAPSULE, EXTENDED RELEASE ORAL DAILY
Qty: 90 CAPSULE | Refills: 1 | Status: SHIPPED | OUTPATIENT
Start: 2024-05-12

## 2024-05-31 ENCOUNTER — OFFICE VISIT (OUTPATIENT)
Dept: INTERNAL MEDICINE CLINIC | Facility: OTHER | Age: 19
End: 2024-05-31
Payer: COMMERCIAL

## 2024-05-31 VITALS
SYSTOLIC BLOOD PRESSURE: 118 MMHG | DIASTOLIC BLOOD PRESSURE: 70 MMHG | OXYGEN SATURATION: 97 % | WEIGHT: 126.8 LBS | HEIGHT: 63 IN | TEMPERATURE: 98.5 F | BODY MASS INDEX: 22.47 KG/M2 | HEART RATE: 94 BPM

## 2024-05-31 DIAGNOSIS — L50.2 HIVES DUE TO HEAT EXPOSURE: ICD-10-CM

## 2024-05-31 DIAGNOSIS — F41.0 PANIC ATTACKS: ICD-10-CM

## 2024-05-31 DIAGNOSIS — F41.9 ANXIETY: ICD-10-CM

## 2024-05-31 DIAGNOSIS — H00.012 HORDEOLUM EXTERNUM OF RIGHT LOWER EYELID: Primary | ICD-10-CM

## 2024-05-31 DIAGNOSIS — G43.009 MIGRAINE WITHOUT AURA AND WITHOUT STATUS MIGRAINOSUS, NOT INTRACTABLE: ICD-10-CM

## 2024-05-31 PROCEDURE — 99214 OFFICE O/P EST MOD 30 MIN: CPT | Performed by: NURSE PRACTITIONER

## 2024-05-31 RX ORDER — ERYTHROMYCIN 5 MG/G
0.5 OINTMENT OPHTHALMIC
Qty: 5 G | Refills: 0 | Status: SHIPPED | OUTPATIENT
Start: 2024-05-31 | End: 2024-06-05

## 2024-05-31 RX ORDER — CETIRIZINE HYDROCHLORIDE 10 MG/1
10 TABLET ORAL AS NEEDED
COMMUNITY
End: 2024-05-31

## 2024-05-31 NOTE — PATIENT INSTRUCTIONS
Start erythromycin ointment into right eye before bed for 5 days   Warm compresses to eye 3-4 times a day

## 2024-05-31 NOTE — PROGRESS NOTES
Assessment/Plan:    Problem List Items Addressed This Visit       Hives due to heat exposure     -Patient was seen and evaluated by allergist .  Switched from Claritin to Zyrtec but has not noticed any improvement in symptoms.  Symptoms were stable on Claritin 10 mg daily which made her less tired.  She was unhappy with this provider but does have her referral to Dr Yates who she plans to follow up with  - continue claritin 10mg daily          Anxiety     -Controlled with Effexor XR 37.5 mg daily         Migraine without aura and without status migrainosus, not intractable     Significantly improved with Effexor  -Continue Effexor XR 37.5 mg daily  -Continue zolmitriptan 2.5 mg as needed         Panic attacks     -Anxiety improved and controlled with Effexor has not needed to use hydroxyzine,           Other Visit Diagnoses       Hordeolum externum of right lower eyelid    -  Primary    start warm compresses 3-4x daily  start erythromycin ointment qhs x 5 days    Relevant Medications    erythromycin (ILOTYCIN) ophthalmic ointment            BMI Counseling: Body mass index is 22.46 kg/m².     [employer] software was used to dictate this note.  It may contain errors with dictating incorrect words or incorrect spelling. Please contact the provider directly with any questions.    Subjective:      Patient ID: Anjelica Berger is a 19 y.o. female.      [facility]    Hives due to heat-she was seen by allergist Dr Garay.  She was not pleased with his bedside manner.  He recommended she stop Claritin 10 mg daily and switch to Zyrtec 2 tablets in the morning and 2 tablets before bed, she tells me that the Zyrtec makes her much more tired and she has not noticed any change in her symptoms.  She continues to feel her symptoms are overall stable on the Claritin.     Migraines - significantly improved on the Effexor 37.5mg daily. She's only had  1 migraine since starting it and it  was mild, resolved with zomig and Excedrin.     Anxiety-significantly improved with Effexor 37.5 mg daily.  She has not yet needed to use the hydroxyzine for panic attacks since starting the Effexor.     She notes that she has a small swollen area under her right eye that is tender.           The following portions of the patient's history were reviewed and updated as appropriate: allergies, current medications, past family history, past medical history, past social history, past surgical history, and problem list.    Review of Systems   Constitutional:  Negative for chills and fever.   Eyes:  Negative for visual disturbance.   Skin:  Positive for rash.   Neurological:  Positive for headaches (improved).   Psychiatric/Behavioral:  The patient is nervous/anxious (improved).          Past Medical History:   Diagnosis Date    Allergic     Seasonal    Anxiety     Asthma          Current Outpatient Medications:     albuterol (PROVENTIL HFA,VENTOLIN HFA) 90 mcg/act inhaler, Inhale 2 puffs every 6 (six) hours as needed for wheezing, Disp: , Rfl:     erythromycin (ILOTYCIN) ophthalmic ointment, Administer 0.5 inches to the right eye daily at bedtime for 5 days, Disp: 5 g, Rfl: 0    hydrOXYzine HCL (ATARAX) 10 mg tablet, Take 1 tablet (10 mg total) by mouth every 6 (six) hours as needed for anxiety, Disp: 30 tablet, Rfl: 1    Junel 1/20 1-20 MG-MCG per tablet, Take 1 tablet by mouth daily, Disp: , Rfl:     loratadine (Claritin) 10 mg tablet, , Disp: , Rfl:     venlafaxine (EFFEXOR-XR) 37.5 mg 24 hr capsule, TAKE 1 CAPSULE BY MOUTH EVERY DAY, Disp: 90 capsule, Rfl: 1    ZOLMitriptan (ZOMIG) 2.5 MG tablet, Take 1 tablet (2.5 mg total) by mouth once as needed for migraine may repeat after 2 hours, max 5mg per 24 hours, Disp: 10 tablet, Rfl: 1    No Known Allergies    Social History   History reviewed. No pertinent surgical history.  Family History   Problem Relation Age of Onset    Arthritis Mother         Diagnosed at 20     "Migraines Mother     Hyperparathyroidism Father     Nephrolithiasis Father     Asthma Maternal Grandmother     Breast cancer Maternal Grandmother     Sjogren's syndrome Maternal Grandmother     Von Willebrand disease Maternal Grandmother     Asthma Paternal Grandmother     Autoimmune disease Paternal Grandmother     Sjogren's syndrome Paternal Grandmother     Fibromyalgia Paternal Grandmother     Diabetes Paternal Aunt         Pre-diabetic       Objective:  /70 (BP Location: Left arm, Patient Position: Sitting, Cuff Size: Standard)   Pulse 94   Temp 98.5 °F (36.9 °C) (Temporal)   Ht 5' 3\" (1.6 m)   Wt 57.5 kg (126 lb 12.8 oz) Comment: with shoes on  SpO2 97% Comment: ra  BMI 22.46 kg/m²      Physical Exam  Vitals reviewed.   Constitutional:       General: She is not in acute distress.     Appearance: Normal appearance. She is normal weight. She is not diaphoretic.   HENT:      Head: Normocephalic and atraumatic.   Eyes:      General:         Right eye: Hordeolum (right lower lid) present.      Extraocular Movements: Extraocular movements intact.      Conjunctiva/sclera: Conjunctivae normal.      Pupils: Pupils are equal, round, and reactive to light.   Cardiovascular:      Rate and Rhythm: Normal rate and regular rhythm.      Heart sounds: Normal heart sounds. No murmur heard.  Pulmonary:      Effort: Pulmonary effort is normal. No respiratory distress.      Breath sounds: Normal breath sounds. No wheezing, rhonchi or rales.   Neurological:      Mental Status: She is alert and oriented to person, place, and time. Mental status is at baseline.   Psychiatric:         Mood and Affect: Mood normal.         Behavior: Behavior normal.           "

## 2024-05-31 NOTE — ASSESSMENT & PLAN NOTE
Significantly improved with Effexor  -Continue Effexor XR 37.5 mg daily  -Continue zolmitriptan 2.5 mg as needed

## 2024-05-31 NOTE — ASSESSMENT & PLAN NOTE
-Patient was seen and evaluated by allergist .  Switched from Claritin to Zyrtec but has not noticed any improvement in symptoms.  Symptoms were stable on Claritin 10 mg daily which made her less tired.  She was unhappy with this provider but does have her referral to Dr Yates who she plans to follow up with  - continue claritin 10mg daily

## 2024-07-14 ENCOUNTER — HOSPITAL ENCOUNTER (EMERGENCY)
Facility: HOSPITAL | Age: 19
Discharge: HOME | End: 2024-07-14
Attending: EMERGENCY MEDICINE | Admitting: EMERGENCY MEDICINE
Payer: COMMERCIAL

## 2024-07-14 VITALS
OXYGEN SATURATION: 100 % | RESPIRATION RATE: 16 BRPM | TEMPERATURE: 98.2 F | HEART RATE: 74 BPM | SYSTOLIC BLOOD PRESSURE: 123 MMHG | DIASTOLIC BLOOD PRESSURE: 63 MMHG | BODY MASS INDEX: 22.77 KG/M2 | WEIGHT: 128.53 LBS

## 2024-07-14 DIAGNOSIS — G43.909 MIGRAINE: Primary | ICD-10-CM

## 2024-07-14 LAB
ANION GAP SERPL CALCULATED.3IONS-SCNC: 6 MMOL/L (ref 4–13)
BACTERIA UR QL AUTO: ABNORMAL /HPF
BASOPHILS # BLD AUTO: 0.02 THOUSANDS/ÂΜL (ref 0–0.1)
BASOPHILS NFR BLD AUTO: 0 % (ref 0–1)
BILIRUB UR QL STRIP: NEGATIVE
BUN SERPL-MCNC: 8 MG/DL (ref 5–25)
CALCIUM SERPL-MCNC: 9.5 MG/DL (ref 8.4–10.2)
CHLORIDE SERPL-SCNC: 105 MMOL/L (ref 96–108)
CLARITY UR: CLEAR
CO2 SERPL-SCNC: 25 MMOL/L (ref 21–32)
COLOR UR: YELLOW
CREAT SERPL-MCNC: 0.82 MG/DL (ref 0.6–1.3)
EOSINOPHIL # BLD AUTO: 0.04 THOUSAND/ÂΜL (ref 0–0.61)
EOSINOPHIL NFR BLD AUTO: 1 % (ref 0–6)
ERYTHROCYTE [DISTWIDTH] IN BLOOD BY AUTOMATED COUNT: 11.9 % (ref 11.6–15.1)
EXT PREGNANCY TEST URINE: NEGATIVE
EXT. CONTROL: NORMAL
GFR SERPL CREATININE-BSD FRML MDRD: 102 ML/MIN/1.73SQ M
GLUCOSE SERPL-MCNC: 90 MG/DL (ref 65–140)
GLUCOSE UR STRIP-MCNC: NEGATIVE MG/DL
HCT VFR BLD AUTO: 41.5 % (ref 34.8–46.1)
HGB BLD-MCNC: 14 G/DL (ref 11.5–15.4)
HGB UR QL STRIP.AUTO: NEGATIVE
IMM GRANULOCYTES # BLD AUTO: 0.01 THOUSAND/UL (ref 0–0.2)
IMM GRANULOCYTES NFR BLD AUTO: 0 % (ref 0–2)
KETONES UR STRIP-MCNC: NEGATIVE MG/DL
LEUKOCYTE ESTERASE UR QL STRIP: ABNORMAL
LYMPHOCYTES # BLD AUTO: 2.33 THOUSANDS/ÂΜL (ref 0.6–4.47)
LYMPHOCYTES NFR BLD AUTO: 35 % (ref 14–44)
MCH RBC QN AUTO: 30 PG (ref 26.8–34.3)
MCHC RBC AUTO-ENTMCNC: 33.7 G/DL (ref 31.4–37.4)
MCV RBC AUTO: 89 FL (ref 82–98)
MONOCYTES # BLD AUTO: 0.39 THOUSAND/ÂΜL (ref 0.17–1.22)
MONOCYTES NFR BLD AUTO: 6 % (ref 4–12)
MUCOUS THREADS UR QL AUTO: ABNORMAL
NEUTROPHILS # BLD AUTO: 3.91 THOUSANDS/ÂΜL (ref 1.85–7.62)
NEUTS SEG NFR BLD AUTO: 58 % (ref 43–75)
NITRITE UR QL STRIP: NEGATIVE
NON-SQ EPI CELLS URNS QL MICRO: ABNORMAL /HPF
NRBC BLD AUTO-RTO: 0 /100 WBCS
PH UR STRIP.AUTO: 6.5 [PH] (ref 4.5–8)
PLATELET # BLD AUTO: 214 THOUSANDS/UL (ref 149–390)
PMV BLD AUTO: 8.9 FL (ref 8.9–12.7)
POTASSIUM SERPL-SCNC: 3.7 MMOL/L (ref 3.5–5.3)
PROT UR STRIP-MCNC: NEGATIVE MG/DL
RBC # BLD AUTO: 4.67 MILLION/UL (ref 3.81–5.12)
RBC #/AREA URNS AUTO: ABNORMAL /HPF
SODIUM SERPL-SCNC: 136 MMOL/L (ref 135–147)
SP GR UR STRIP.AUTO: 1.01 (ref 1–1.03)
UROBILINOGEN UR QL STRIP.AUTO: 0.2 E.U./DL
WBC # BLD AUTO: 6.7 THOUSAND/UL (ref 4.31–10.16)
WBC #/AREA URNS AUTO: ABNORMAL /HPF

## 2024-07-14 PROCEDURE — 99284 EMERGENCY DEPT VISIT MOD MDM: CPT | Performed by: EMERGENCY MEDICINE

## 2024-07-14 PROCEDURE — 80048 BASIC METABOLIC PNL TOTAL CA: CPT | Performed by: EMERGENCY MEDICINE

## 2024-07-14 PROCEDURE — 81001 URINALYSIS AUTO W/SCOPE: CPT

## 2024-07-14 PROCEDURE — 99284 EMERGENCY DEPT VISIT MOD MDM: CPT

## 2024-07-14 PROCEDURE — 36415 COLL VENOUS BLD VENIPUNCTURE: CPT | Performed by: EMERGENCY MEDICINE

## 2024-07-14 PROCEDURE — 85025 COMPLETE CBC W/AUTO DIFF WBC: CPT | Performed by: EMERGENCY MEDICINE

## 2024-07-14 PROCEDURE — 96375 TX/PRO/DX INJ NEW DRUG ADDON: CPT

## 2024-07-14 PROCEDURE — 96361 HYDRATE IV INFUSION ADD-ON: CPT

## 2024-07-14 PROCEDURE — 96365 THER/PROPH/DIAG IV INF INIT: CPT

## 2024-07-14 PROCEDURE — 81025 URINE PREGNANCY TEST: CPT | Performed by: EMERGENCY MEDICINE

## 2024-07-14 RX ORDER — DEXAMETHASONE SODIUM PHOSPHATE 10 MG/ML
10 INJECTION, SOLUTION INTRAMUSCULAR; INTRAVENOUS ONCE
Status: COMPLETED | OUTPATIENT
Start: 2024-07-14 | End: 2024-07-14

## 2024-07-14 RX ORDER — KETOROLAC TROMETHAMINE 30 MG/ML
30 INJECTION, SOLUTION INTRAMUSCULAR; INTRAVENOUS ONCE
Status: COMPLETED | OUTPATIENT
Start: 2024-07-14 | End: 2024-07-14

## 2024-07-14 RX ORDER — METOCLOPRAMIDE HYDROCHLORIDE 5 MG/ML
10 INJECTION INTRAMUSCULAR; INTRAVENOUS ONCE
Status: COMPLETED | OUTPATIENT
Start: 2024-07-14 | End: 2024-07-14

## 2024-07-14 RX ORDER — MAGNESIUM SULFATE HEPTAHYDRATE 40 MG/ML
2 INJECTION, SOLUTION INTRAVENOUS ONCE
Status: COMPLETED | OUTPATIENT
Start: 2024-07-14 | End: 2024-07-14

## 2024-07-14 RX ORDER — DIPHENHYDRAMINE HYDROCHLORIDE 50 MG/ML
25 INJECTION INTRAMUSCULAR; INTRAVENOUS ONCE
Status: DISCONTINUED | OUTPATIENT
Start: 2024-07-14 | End: 2024-07-14 | Stop reason: HOSPADM

## 2024-07-14 RX ADMIN — KETOROLAC TROMETHAMINE 30 MG: 30 INJECTION, SOLUTION INTRAMUSCULAR; INTRAVENOUS at 19:07

## 2024-07-14 RX ADMIN — MAGNESIUM SULFATE HEPTAHYDRATE 2 G: 40 INJECTION, SOLUTION INTRAVENOUS at 20:16

## 2024-07-14 RX ADMIN — SODIUM CHLORIDE 1000 ML: 0.9 INJECTION, SOLUTION INTRAVENOUS at 19:06

## 2024-07-14 RX ADMIN — DEXAMETHASONE SODIUM PHOSPHATE 10 MG: 10 INJECTION INTRAMUSCULAR; INTRAVENOUS at 20:11

## 2024-07-14 RX ADMIN — METOCLOPRAMIDE 10 MG: 5 INJECTION, SOLUTION INTRAMUSCULAR; INTRAVENOUS at 19:07

## 2024-07-14 NOTE — ED PROVIDER NOTES
Pt Name: Anjelica Berger  MRN: [id number]  Birthdate 2005  Age/Sex: 19 y.o. female  Date of evaluation: 7/14/2024  PCP: LETY Garcia    CHIEF COMPLAINT    Chief Complaint   Patient presents with    Migraine     Pt c/o migraine starting around 4:15pm. States hx of migraines and took medication with no relief. States had some vision loss which has resolved. Also c/o dizziness and nausea.         HPI    Anjelica presents to the Emergency Department complaining of migraine headache.  She started with an ocular migraine with visual disturbance.  She took her migraine medication and that seemed to resolve but she then developed a headache that was described as significant pressure.  She has had migraines in the past.  She has never seen Neuro or had imaging but they were managed by her PCP.        HPI      Past Medical and Surgical History    Past Medical History:   Diagnosis Date    Allergic     Seasonal    Anxiety     Asthma        History reviewed. No pertinent surgical history.    Family History   Problem Relation Age of Onset    Arthritis Mother         Diagnosed at 20    Migraines Mother     Hyperparathyroidism Father     Nephrolithiasis Father     Asthma Maternal Grandmother     Breast cancer Maternal Grandmother     Sjogren's syndrome Maternal Grandmother     Von Willebrand disease Maternal Grandmother     Asthma Paternal Grandmother     Autoimmune disease Paternal Grandmother     Sjogren's syndrome Paternal Grandmother     Fibromyalgia Paternal Grandmother     Diabetes Paternal Aunt         Pre-diabetic       Social History     Tobacco Use    Smoking status: Never    Smokeless tobacco: Never   Vaping Use    Vaping status: Never Used   Substance Use Topics    Alcohol use: Yes     Alcohol/week: 2.0 standard drinks of alcohol     Types: 2 Standard drinks or equivalent per week     Comment: occasionally    Drug use: Yes     Types: Marijuana         .    Allergies    No Known Allergies    Home  Medications    Prior to Admission medications    Medication Sig Start Date End Date Taking? Authorizing Provider   albuterol (PROVENTIL HFA,VENTOLIN HFA) 90 mcg/act inhaler Inhale 2 puffs every 6 (six) hours as needed for wheezing    Historical Provider, MD   hydrOXYzine HCL (ATARAX) 10 mg tablet Take 1 tablet (10 mg total) by mouth every 6 (six) hours as needed for anxiety 4/19/24   LETY Garcia   Junel 1/20 1-20 MG-MCG per tablet Take 1 tablet by mouth daily 3/5/24   Historical Provider, MD   loratadine (Claritin) 10 mg tablet  3/21/24   Historical Provider, MD   venlafaxine (EFFEXOR-XR) 37.5 mg 24 hr capsule TAKE 1 CAPSULE BY MOUTH EVERY DAY 5/12/24   LETY Garcia   ZOLMitriptan (ZOMIG) 2.5 MG tablet Take 1 tablet (2.5 mg total) by mouth once as needed for migraine may repeat after 2 hours, max 5mg per 24 hours 4/19/24   LETY Garcia           Review of Systems    Review of Systems   Constitutional:  Negative for chills and fever.   HENT:  Negative for ear pain and sore throat.    Eyes:  Positive for visual disturbance. Negative for pain.   Respiratory:  Negative for cough and shortness of breath.    Cardiovascular:  Negative for chest pain and palpitations.   Gastrointestinal:  Positive for nausea. Negative for abdominal pain and vomiting.   Genitourinary:  Negative for dysuria and hematuria.   Musculoskeletal:  Negative for arthralgias and back pain.   Skin:  Negative for color change and rash.   Neurological:  Positive for dizziness and headaches. Negative for seizures and syncope.   All other systems reviewed and are negative.        Physical Exam      ED Triage Vitals   Temperature Pulse Respirations Blood Pressure SpO2   07/14/24 1752 07/14/24 1752 07/14/24 1752 07/14/24 1752 07/14/24 1752   98.2 °F (36.8 °C) 92 18 132/80 99 %      Temp Source Heart Rate Source Patient Position - Orthostatic VS BP Location FiO2 (%)   07/14/24 1752 07/14/24 1752 07/14/24 1752 07/14/24 1752 --    Oral Monitor Sitting Right arm       Pain Score       07/14/24 1816       7               Physical Exam  Vitals and nursing note reviewed.   Constitutional:       General: She is not in acute distress.     Appearance: She is well-developed.   HENT:      Head: Normocephalic and atraumatic.   Eyes:      Conjunctiva/sclera: Conjunctivae normal.   Cardiovascular:      Rate and Rhythm: Normal rate and regular rhythm.      Heart sounds: No murmur heard.  Pulmonary:      Effort: Pulmonary effort is normal. No respiratory distress.      Breath sounds: Normal breath sounds.   Abdominal:      Palpations: Abdomen is soft.      Tenderness: There is no abdominal tenderness.   Musculoskeletal:         General: No swelling.      Cervical back: Neck supple.   Skin:     General: Skin is warm and dry.      Capillary Refill: Capillary refill takes less than 2 seconds.   Neurological:      Mental Status: She is alert.   Psychiatric:         Mood and Affect: Mood normal.                Assessment and Plan    Anjelica Berger is a 19 y.o. female who presents with migraine headache that began with a brief visual disturbance. Physical examination unremarkable. Differential diagnosis (not completely inclusive) includes migraine vs other causes of acute headache. Plan will be to perform diagnostic testing and treat symptomatically.      MDM  Number of Diagnoses or Management Options  Migraine  Diagnosis management comments: Patient's headache is similar to prior chronic headaches.  There are no focal neurologic findings on exam.  The headache was not sudden in onset and was not maximum intensity at onset.  Patient does not have a fever and is not immunocompromised.   Headache has not been progressively worsening.  Patient denies jaw claudication, muscle aches or temporal artery pain.  Doubt carbon monoxide poisoning as there are not multiple patients with similar complaints in the home.  Patient is not pregnant and is not newly post  pregnancy.  Patient denies any history of clotting disorders.  Patient denies trauma.  Patient denies eye pain.  Patient denies any cervical manipulation with facial pain or headache.              Diagnostic Results        Labs:    Results for orders placed or performed during the hospital encounter of 07/14/24   CBC and differential   Result Value Ref Range    WBC 6.70 4.31 - 10.16 Thousand/uL    RBC 4.67 3.81 - 5.12 Million/uL    Hemoglobin 14.0 11.5 - 15.4 g/dL    Hematocrit 41.5 34.8 - 46.1 %    MCV 89 82 - 98 fL    MCH 30.0 26.8 - 34.3 pg    MCHC 33.7 31.4 - 37.4 g/dL    RDW 11.9 11.6 - 15.1 %    MPV 8.9 8.9 - 12.7 fL    Platelets 214 149 - 390 Thousands/uL    nRBC 0 /100 WBCs    Segmented % 58 43 - 75 %    Immature Grans % 0 0 - 2 %    Lymphocytes % 35 14 - 44 %    Monocytes % 6 4 - 12 %    Eosinophils Relative 1 0 - 6 %    Basophils Relative 0 0 - 1 %    Absolute Neutrophils 3.91 1.85 - 7.62 Thousands/µL    Absolute Immature Grans 0.01 0.00 - 0.20 Thousand/uL    Absolute Lymphocytes 2.33 0.60 - 4.47 Thousands/µL    Absolute Monocytes 0.39 0.17 - 1.22 Thousand/µL    Eosinophils Absolute 0.04 0.00 - 0.61 Thousand/µL    Basophils Absolute 0.02 0.00 - 0.10 Thousands/µL   Basic metabolic panel   Result Value Ref Range    Sodium 136 135 - 147 mmol/L    Potassium 3.7 3.5 - 5.3 mmol/L    Chloride 105 96 - 108 mmol/L    CO2 25 21 - 32 mmol/L    ANION GAP 6 4 - 13 mmol/L    BUN 8 5 - 25 mg/dL    Creatinine 0.82 0.60 - 1.30 mg/dL    Glucose 90 65 - 140 mg/dL    Calcium 9.5 8.4 - 10.2 mg/dL    eGFR 102 ml/min/1.73sq m   Urine Microscopic   Result Value Ref Range    RBC, UA 1-2 None Seen, 1-2 /hpf    WBC, UA 1-2 None Seen, 1-2 /hpf    Epithelial Cells Occasional None Seen, Occasional /hpf    Bacteria, UA Moderate (A) None Seen, Occasional /hpf    MUCUS THREADS Occasional (A) None Seen   POCT pregnancy, urine   Result Value Ref Range    EXT Preg Test, Ur Negative     Control Valid    Urine Macroscopic, POC   Result Value  Ref Range    Color, UA Yellow     Clarity, UA Clear     pH, UA 6.5 4.5 - 8.0    Leukocytes, UA Trace (A) Negative    Nitrite, UA Negative Negative    Protein, UA Negative Negative mg/dl    Glucose, UA Negative Negative mg/dl    Ketones, UA Negative Negative mg/dl    Urobilinogen, UA 0.2 0.2, 1.0 E.U./dl E.U./dl    Bilirubin, UA Negative Negative    Occult Blood, UA Negative Negative    Specific Gravity, UA 1.015 1.003 - 1.030       All labs reviewed and utilized in the medical decision making process    Radiology:    No orders to display       All radiology studies independently viewed by me and interpreted by the radiologist.    Procedure    Procedures      ED Course of Care and Re-Assessments    Patient was feeling better after second round of meds.     Medications   diphenhydrAMINE (BENADRYL) injection 25 mg (0 mg Intravenous Hold 7/14/24 1912)   sodium chloride 0.9 % bolus 1,000 mL (0 mL Intravenous Stopped 7/14/24 2007)   ketorolac (TORADOL) injection 30 mg (30 mg Intravenous Given 7/14/24 1907)   metoclopramide (REGLAN) injection 10 mg (10 mg Intravenous Given 7/14/24 1907)   magnesium sulfate 2 g/50 mL IVPB (premix) 2 g (0 g Intravenous Stopped 7/14/24 2137)   dexamethasone (PF) (DECADRON) injection 10 mg (10 mg Intravenous Given 7/14/24 2011)           FINAL IMPRESSION    Final diagnoses:   Migraine         DISPOSITION/PLAN    Time reflects when diagnosis was documented in both MDM as applicable and the Disposition within this note       Time User Action Codes Description Comment    7/14/2024  9:23 PM Janine Azar Add [G43.909] Migraine           ED Disposition       ED Disposition   Discharge    Condition   Stable    Date/Time   Sun Jul 14, 2024  9:23 PM    Comment   Anjelica Berger discharge to home/self care.                   Follow-up Information       Follow up With Specialties Details Why Contact Info    [facility], Nurse Practitioner Schedule an appointment  as soon as possible for a visit   [address] [address]  [phone]                PATIENT REFERRED TO:    LETY Garcia  [address] [address]  [phone]    Schedule an appointment as soon as possible for a visit         DISCHARGE MEDICATIONS:    Patient's Medications   Discharge Prescriptions    No medications on file       No discharge procedures on file.         Janine Azar, DO Janine Azar,   07/14/24 2131

## 2024-07-15 ENCOUNTER — APPOINTMENT (OUTPATIENT)
Dept: LAB | Age: 19
End: 2024-07-15
Payer: COMMERCIAL

## 2024-07-15 ENCOUNTER — APPOINTMENT (OUTPATIENT)
Dept: URGENT CARE | Age: 19
End: 2024-07-15
Payer: COMMERCIAL

## 2024-07-15 DIAGNOSIS — Z02.1 PRE-EMPLOYMENT EXAMINATION: ICD-10-CM

## 2024-07-15 PROCEDURE — 86480 TB TEST CELL IMMUN MEASURE: CPT

## 2024-07-15 PROCEDURE — 36415 COLL VENOUS BLD VENIPUNCTURE: CPT

## 2024-07-16 LAB
GAMMA INTERFERON BACKGROUND BLD IA-ACNC: 0 IU/ML
M TB IFN-G BLD-IMP: NEGATIVE
M TB IFN-G CD4+ BCKGRND COR BLD-ACNC: 0 IU/ML
M TB IFN-G CD4+ BCKGRND COR BLD-ACNC: 0 IU/ML
MITOGEN IGNF BCKGRD COR BLD-ACNC: 10 IU/ML

## 2024-07-19 ENCOUNTER — OFFICE VISIT (OUTPATIENT)
Dept: INTERNAL MEDICINE CLINIC | Facility: OTHER | Age: 19
End: 2024-07-19
Payer: COMMERCIAL

## 2024-07-19 VITALS
OXYGEN SATURATION: 98 % | HEART RATE: 78 BPM | TEMPERATURE: 97.7 F | HEIGHT: 63 IN | WEIGHT: 128.4 LBS | SYSTOLIC BLOOD PRESSURE: 122 MMHG | BODY MASS INDEX: 22.75 KG/M2 | DIASTOLIC BLOOD PRESSURE: 66 MMHG

## 2024-07-19 DIAGNOSIS — T75.3XXA CAR SICKNESS, INITIAL ENCOUNTER: ICD-10-CM

## 2024-07-19 DIAGNOSIS — G43.009 MIGRAINE WITHOUT AURA AND WITHOUT STATUS MIGRAINOSUS, NOT INTRACTABLE: Primary | ICD-10-CM

## 2024-07-19 PROCEDURE — 99213 OFFICE O/P EST LOW 20 MIN: CPT | Performed by: NURSE PRACTITIONER

## 2024-07-19 RX ORDER — ONDANSETRON 4 MG/1
4 TABLET, FILM COATED ORAL EVERY 8 HOURS PRN
Qty: 20 TABLET | Refills: 0 | Status: SHIPPED | OUTPATIENT
Start: 2024-07-19

## 2024-07-19 NOTE — PROGRESS NOTES
Assessment/Plan:    Problem List Items Addressed This Visit       Migraine without aura and without status migrainosus, not intractable - Primary     - ED note reviewed in detail   - continue Effexor -XR 37.5mg daily  - continue zomig 2.5mg prn, reminded that she may repeat this dose once 2 hours after the first dose if needed  - start zofran for nausea w/ migraines  - start magnesium 400mg daily  - follow up as scheduled in Nov, sooner for any worsening symptoms           Other Visit Diagnoses       Car sickness, initial encounter        Relevant Medications    ondansetron (ZOFRAN) 4 mg tablet            BMI Counseling: Body mass index is 22.75 kg/m².         [employer] software was used to dictate this note.  It may contain errors with dictating incorrect words or incorrect spelling. Please contact the provider directly with any questions.    Subjective:      Patient ID: Anjelica Berger is a 19 y.o. female.    HPI    Patient presents today for ED follow up from 7/14 with an ocular migraine. She has a hx of migraines with similar symptoms in the past, but states this time the pressure was severe. She also has a migraine on 7/12 which resolved with her zomig. On 7/14 she had bilateral head pain and had partial vision loss of her left eye. She states after taking one dose of the zomig her vision improved but the headache continued to worsen. She also tried excedrin with no relief. She went to the ED. No imaging completed. Labs were unremarkable. She was given toradol, Reglan, dexamethasone and magnesium and felt much better. She denies any migraines since.     She is asking for a medication for nausea that she gets in the car on long drives. She is leaving [employer] for the [place].     The following portions of the patient's history were reviewed and updated as appropriate: allergies, current medications, past family history, past medical history, past social history, past surgical history, and problem  list.    Review of Systems   Constitutional:  Negative for activity change, appetite change, chills, fever and unexpected weight change.   Eyes:  Negative for visual disturbance (resolved).   Gastrointestinal:  Positive for nausea (not currently but gets car sick and nausea during migraines).   Neurological:  Negative for headaches.         Past Medical History:   Diagnosis Date    Allergic     Seasonal    Anxiety     Asthma          Current Outpatient Medications:     albuterol (PROVENTIL HFA,VENTOLIN HFA) 90 mcg/act inhaler, Inhale 2 puffs every 6 (six) hours as needed for wheezing, Disp: , Rfl:     hydrOXYzine HCL (ATARAX) 10 mg tablet, Take 1 tablet (10 mg total) by mouth every 6 (six) hours as needed for anxiety, Disp: 30 tablet, Rfl: 1    Junel 1/20 1-20 MG-MCG per tablet, Take 1 tablet by mouth daily, Disp: , Rfl:     ondansetron (ZOFRAN) 4 mg tablet, Take 1 tablet (4 mg total) by mouth every 8 (eight) hours as needed for nausea or vomiting, Disp: 20 tablet, Rfl: 0    venlafaxine (EFFEXOR-XR) 37.5 mg 24 hr capsule, TAKE 1 CAPSULE BY MOUTH EVERY DAY, Disp: 90 capsule, Rfl: 1    ZOLMitriptan (ZOMIG) 2.5 MG tablet, Take 1 tablet (2.5 mg total) by mouth once as needed for migraine may repeat after 2 hours, max 5mg per 24 hours, Disp: 10 tablet, Rfl: 1    loratadine (Claritin) 10 mg tablet, , Disp: , Rfl:     No Known Allergies    Social History   History reviewed. No pertinent surgical history.  Family History   Problem Relation Age of Onset    Arthritis Mother         Diagnosed at 20    Migraines Mother     Hyperparathyroidism Father     Nephrolithiasis Father     Asthma Maternal Grandmother     Breast cancer Maternal Grandmother     Sjogren's syndrome Maternal Grandmother     Von Willebrand disease Maternal Grandmother     Asthma Paternal Grandmother     Autoimmune disease Paternal Grandmother     Sjogren's syndrome Paternal Grandmother     Fibromyalgia Paternal Grandmother     Diabetes Paternal Aunt          "Pre-diabetic       Objective:  /66 (BP Location: Left arm, Patient Position: Sitting, Cuff Size: Standard)   Pulse 78   Temp 97.7 °F (36.5 °C) (Temporal)   Ht 5' 3\" (1.6 m)   Wt 58.2 kg (128 lb 6.4 oz) Comment: with shoes on  LMP 06/23/2024 (Approximate)   SpO2 98% Comment: ra  BMI 22.75 kg/m²      Physical Exam  Vitals reviewed.   Constitutional:       General: She is not in acute distress.     Appearance: Normal appearance. She is normal weight. She is not diaphoretic.   HENT:      Head: Normocephalic and atraumatic.   Eyes:      Extraocular Movements: Extraocular movements intact.      Conjunctiva/sclera: Conjunctivae normal.      Pupils: Pupils are equal, round, and reactive to light.   Cardiovascular:      Rate and Rhythm: Normal rate and regular rhythm.      Heart sounds: Normal heart sounds. No murmur heard.  Pulmonary:      Effort: Pulmonary effort is normal. No respiratory distress.      Breath sounds: Normal breath sounds. No wheezing, rhonchi or rales.   Neurological:      Mental Status: She is alert and oriented to person, place, and time. Mental status is at baseline.   Psychiatric:         Mood and Affect: Mood normal.         Behavior: Behavior normal.         Thought Content: Thought content normal.         Judgment: Judgment normal.           "

## 2024-07-19 NOTE — PATIENT INSTRUCTIONS
Start magnesium 400mg daily   Continue same other medications  Please notify me if migraines occur more frequently or continue to be severe in intensity with no relief of medication     You may use a second dose of the zomig 2 hours after the first dose if needed.

## 2024-07-19 NOTE — ASSESSMENT & PLAN NOTE
- ED note reviewed in detail   - continue Effexor -XR 37.5mg daily  - continue zomig 2.5mg prn, reminded that she may repeat this dose once 2 hours after the first dose if needed  - start zofran for nausea w/ migraines  - start magnesium 400mg daily  - follow up as scheduled in Nov, sooner for any worsening symptoms

## 2024-09-18 ENCOUNTER — PATIENT MESSAGE (OUTPATIENT)
Dept: INTERNAL MEDICINE CLINIC | Facility: OTHER | Age: 19
End: 2024-09-18

## 2024-10-31 ENCOUNTER — OFFICE VISIT (OUTPATIENT)
Dept: INTERNAL MEDICINE CLINIC | Facility: OTHER | Age: 19
End: 2024-10-31
Payer: COMMERCIAL

## 2024-10-31 ENCOUNTER — APPOINTMENT (OUTPATIENT)
Dept: LAB | Facility: IMAGING CENTER | Age: 19
End: 2024-10-31
Payer: COMMERCIAL

## 2024-10-31 VITALS
DIASTOLIC BLOOD PRESSURE: 64 MMHG | TEMPERATURE: 98 F | BODY MASS INDEX: 24.27 KG/M2 | WEIGHT: 137 LBS | SYSTOLIC BLOOD PRESSURE: 102 MMHG | HEIGHT: 63 IN | HEART RATE: 95 BPM | OXYGEN SATURATION: 99 %

## 2024-10-31 DIAGNOSIS — F41.9 ANXIETY: ICD-10-CM

## 2024-10-31 DIAGNOSIS — G43.009 MIGRAINE WITHOUT AURA AND WITHOUT STATUS MIGRAINOSUS, NOT INTRACTABLE: ICD-10-CM

## 2024-10-31 DIAGNOSIS — R53.83 FATIGUE, UNSPECIFIED TYPE: ICD-10-CM

## 2024-10-31 DIAGNOSIS — F41.9 ANXIETY AND DEPRESSION: Primary | ICD-10-CM

## 2024-10-31 DIAGNOSIS — F32.A ANXIETY AND DEPRESSION: Primary | ICD-10-CM

## 2024-10-31 LAB
BASOPHILS # BLD AUTO: 0.01 THOUSANDS/ΜL (ref 0–0.1)
BASOPHILS NFR BLD AUTO: 0 % (ref 0–1)
EOSINOPHIL # BLD AUTO: 0.05 THOUSAND/ΜL (ref 0–0.61)
EOSINOPHIL NFR BLD AUTO: 1 % (ref 0–6)
ERYTHROCYTE [DISTWIDTH] IN BLOOD BY AUTOMATED COUNT: 11.8 % (ref 11.6–15.1)
HCT VFR BLD AUTO: 40.3 % (ref 34.8–46.1)
HGB BLD-MCNC: 13.2 G/DL (ref 11.5–15.4)
IMM GRANULOCYTES # BLD AUTO: 0.01 THOUSAND/UL (ref 0–0.2)
IMM GRANULOCYTES NFR BLD AUTO: 0 % (ref 0–2)
LYMPHOCYTES # BLD AUTO: 1.78 THOUSANDS/ΜL (ref 0.6–4.47)
LYMPHOCYTES NFR BLD AUTO: 37 % (ref 14–44)
MCH RBC QN AUTO: 29.7 PG (ref 26.8–34.3)
MCHC RBC AUTO-ENTMCNC: 32.8 G/DL (ref 31.4–37.4)
MCV RBC AUTO: 91 FL (ref 82–98)
MONOCYTES # BLD AUTO: 0.4 THOUSAND/ΜL (ref 0.17–1.22)
MONOCYTES NFR BLD AUTO: 8 % (ref 4–12)
NEUTROPHILS # BLD AUTO: 2.53 THOUSANDS/ΜL (ref 1.85–7.62)
NEUTS SEG NFR BLD AUTO: 54 % (ref 43–75)
NRBC BLD AUTO-RTO: 0 /100 WBCS
PLATELET # BLD AUTO: 226 THOUSANDS/UL (ref 149–390)
PMV BLD AUTO: 9.7 FL (ref 8.9–12.7)
RBC # BLD AUTO: 4.44 MILLION/UL (ref 3.81–5.12)
TSH SERPL DL<=0.05 MIU/L-ACNC: 1.64 UIU/ML (ref 0.45–4.5)
WBC # BLD AUTO: 4.78 THOUSAND/UL (ref 4.31–10.16)

## 2024-10-31 PROCEDURE — 84443 ASSAY THYROID STIM HORMONE: CPT

## 2024-10-31 PROCEDURE — 36415 COLL VENOUS BLD VENIPUNCTURE: CPT

## 2024-10-31 PROCEDURE — 99214 OFFICE O/P EST MOD 30 MIN: CPT | Performed by: NURSE PRACTITIONER

## 2024-10-31 PROCEDURE — 85025 COMPLETE CBC W/AUTO DIFF WBC: CPT

## 2024-10-31 RX ORDER — VENLAFAXINE HYDROCHLORIDE 75 MG/1
75 CAPSULE, EXTENDED RELEASE ORAL DAILY
Qty: 30 CAPSULE | Refills: 5 | Status: SHIPPED | OUTPATIENT
Start: 2024-10-31

## 2024-10-31 NOTE — ASSESSMENT & PLAN NOTE
Controlled on Effexor XR  
Depression Screening Follow-up Plan: Patient's depression screening was positive with a PHQ-2 score of 5. Their PHQ-9 score was 15. Patient with underlying depression and was advised to continue current medications as prescribed.    -Increase Effexor to 75 mg daily  -Continue counseling  -Follow-up in 4 weeks, sooner as needed  
New onset fatigue in the last 3 weeks  possibly associated with her worsening depression  Increase Effexor to 75 mg daily  Start multivitamin and vitamin D3 1000 IU daily  Will check CBC and TSH  Follow-up in 4 weeks  Discussed adequate sleep, normal sleep-wake cycle at the same time daily.  Healthy diet.  Routine exercise.  
82

## 2024-10-31 NOTE — PROGRESS NOTES
Assessment/Plan:    Problem List Items Addressed This Visit       Anxiety and depression - Primary     Depression Screening Follow-up Plan: Patient's depression screening was positive with a PHQ-2 score of 5. Their PHQ-9 score was 15. Patient with underlying depression and was advised to continue current medications as prescribed.    -Increase Effexor to 75 mg daily  -Continue counseling  -Follow-up in 4 weeks, sooner as needed         Relevant Medications    venlafaxine (EFFEXOR-XR) 75 mg 24 hr capsule    Migraine without aura and without status migrainosus, not intractable     Controlled on Effexor XR         Relevant Medications    venlafaxine (EFFEXOR-XR) 75 mg 24 hr capsule    Fatigue     New onset fatigue in the last 3 weeks  possibly associated with her worsening depression  Increase Effexor to 75 mg daily  Start multivitamin and vitamin D3 1000 IU daily  Will check CBC and TSH  Follow-up in 4 weeks  Discussed adequate sleep, normal sleep-wake cycle at the same time daily.  Healthy diet.  Routine exercise.         Relevant Orders    CBC and differential    TSH, 3rd generation with Free T4 reflex       BMI Counseling: Body mass index is 24.27 kg/m².          [employer] software was used to dictate this note.  It may contain errors with dictating incorrect words or incorrect spelling. Please contact the provider directly with any questions.    Subjective:      Patient ID: Anjelica Berger is a 19 y.o. female.    HPI    Patient presents today to discuss an antidepressant. She has been following with a therapist for the last 2 years and more recently her therapist recommended discussing medication. She states for the last 3 weeks her depression has been worse. She has been sleeping enough but still feeling very tired throughout the day. She states school has been stressful. 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. 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 She is now doing  very well in school but still has that fear at times. She states she really enjoys nursing.   She feels her anxiety is controlled but the depression is worse. She denies any SI or HI.    The following portions of the patient's history were reviewed and updated as appropriate: allergies, current medications, past family history, past medical history, past social history, past surgical history, and problem list.    Review of Systems   Constitutional:  Positive for fatigue. Negative for chills and fever.   Psychiatric/Behavioral:  Positive for dysphoric mood. Negative for self-injury, sleep disturbance and suicidal ideas. The patient is nervous/anxious.          Past Medical History:   Diagnosis Date    Allergic     Seasonal    Anxiety     Asthma          Current Outpatient Medications:     albuterol (PROVENTIL HFA,VENTOLIN HFA) 90 mcg/act inhaler, Inhale 2 puffs every 6 (six) hours as needed for wheezing, Disp: , Rfl:     hydrOXYzine HCL (ATARAX) 10 mg tablet, Take 1 tablet (10 mg total) by mouth every 6 (six) hours as needed for anxiety, Disp: 30 tablet, Rfl: 1    Junel 1/20 1-20 MG-MCG per tablet, Take 1 tablet by mouth daily, Disp: , Rfl:     ondansetron (ZOFRAN) 4 mg tablet, Take 1 tablet (4 mg total) by mouth every 8 (eight) hours as needed for nausea or vomiting, Disp: 20 tablet, Rfl: 0    venlafaxine (EFFEXOR-XR) 75 mg 24 hr capsule, Take 1 capsule (75 mg total) by mouth daily, Disp: 30 capsule, Rfl: 5    ZOLMitriptan (ZOMIG) 2.5 MG tablet, Take 1 tablet (2.5 mg total) by mouth once as needed for migraine may repeat after 2 hours, max 5mg per 24 hours, Disp: 10 tablet, Rfl: 1    No Known Allergies    Social History   History reviewed. No pertinent surgical history.  Family History   Problem Relation Age of Onset    Arthritis Mother         Diagnosed at 20    Migraines Mother     Hyperparathyroidism Father     Nephrolithiasis Father     Asthma Maternal Grandmother     Breast cancer Maternal Grandmother      "Sjogren's syndrome Maternal Grandmother     Von Willebrand disease Maternal Grandmother     Asthma Paternal Grandmother     Autoimmune disease Paternal Grandmother     Sjogren's syndrome Paternal Grandmother     Fibromyalgia Paternal Grandmother     Diabetes Paternal Aunt         Pre-diabetic       Objective:  /64 (BP Location: Left arm, Patient Position: Sitting, Cuff Size: Adult)   Pulse 95   Temp 98 °F (36.7 °C)   Ht 5' 3\" (1.6 m)   Wt 62.1 kg (137 lb)   SpO2 99%   BMI 24.27 kg/m²      Physical Exam  Vitals reviewed.   Constitutional:       General: She is not in acute distress.     Appearance: Normal appearance. She is normal weight. She is not diaphoretic.   HENT:      Head: Normocephalic and atraumatic.   Eyes:      Extraocular Movements: Extraocular movements intact.      Conjunctiva/sclera: Conjunctivae normal.      Pupils: Pupils are equal, round, and reactive to light.   Neck:      Thyroid: No thyromegaly or thyroid tenderness.   Cardiovascular:      Rate and Rhythm: Normal rate and regular rhythm.      Heart sounds: Normal heart sounds. No murmur heard.  Pulmonary:      Effort: Pulmonary effort is normal. No respiratory distress.      Breath sounds: Normal breath sounds. No wheezing, rhonchi or rales.   Neurological:      Mental Status: She is alert and oriented to person, place, and time. Mental status is at baseline.   Psychiatric:         Mood and Affect: Mood normal.         Behavior: Behavior normal.         Thought Content: Thought content normal.         Judgment: Judgment normal.           "

## 2024-10-31 NOTE — PATIENT INSTRUCTIONS
Start multivitamin  Start vitamin D3 1000 IU daily     Increase effexor to 75mg daily    Follow up in 4 weeks

## 2024-11-26 DIAGNOSIS — F41.9 ANXIETY: ICD-10-CM

## 2024-11-26 DIAGNOSIS — G43.009 MIGRAINE WITHOUT AURA AND WITHOUT STATUS MIGRAINOSUS, NOT INTRACTABLE: ICD-10-CM

## 2024-11-27 RX ORDER — VENLAFAXINE HYDROCHLORIDE 75 MG/1
75 CAPSULE, EXTENDED RELEASE ORAL DAILY
Qty: 90 CAPSULE | Refills: 1 | Status: SHIPPED | OUTPATIENT
Start: 2024-11-27 | End: 2024-12-06 | Stop reason: SDUPTHER

## 2024-12-06 ENCOUNTER — OFFICE VISIT (OUTPATIENT)
Dept: INTERNAL MEDICINE CLINIC | Facility: OTHER | Age: 19
End: 2024-12-06
Payer: COMMERCIAL

## 2024-12-06 VITALS
SYSTOLIC BLOOD PRESSURE: 100 MMHG | HEART RATE: 68 BPM | OXYGEN SATURATION: 98 % | DIASTOLIC BLOOD PRESSURE: 80 MMHG | TEMPERATURE: 99.5 F

## 2024-12-06 DIAGNOSIS — F32.2 SEVERE MAJOR DEPRESSIVE DISORDER (HCC): ICD-10-CM

## 2024-12-06 DIAGNOSIS — F41.9 ANXIETY AND DEPRESSION: ICD-10-CM

## 2024-12-06 DIAGNOSIS — R07.9 CHEST PAIN, UNSPECIFIED TYPE: Primary | ICD-10-CM

## 2024-12-06 DIAGNOSIS — F32.A ANXIETY AND DEPRESSION: ICD-10-CM

## 2024-12-06 DIAGNOSIS — F41.9 ANXIETY: ICD-10-CM

## 2024-12-06 DIAGNOSIS — G43.009 MIGRAINE WITHOUT AURA AND WITHOUT STATUS MIGRAINOSUS, NOT INTRACTABLE: ICD-10-CM

## 2024-12-06 LAB — ECG INTERP DURING EX: NORMAL MS

## 2024-12-06 PROCEDURE — 93000 ELECTROCARDIOGRAM COMPLETE: CPT | Performed by: NURSE PRACTITIONER

## 2024-12-06 PROCEDURE — 99214 OFFICE O/P EST MOD 30 MIN: CPT | Performed by: NURSE PRACTITIONER

## 2024-12-06 RX ORDER — VENLAFAXINE HYDROCHLORIDE 37.5 MG/1
37.5 CAPSULE, EXTENDED RELEASE ORAL DAILY
Qty: 30 CAPSULE | Refills: 1 | Status: SHIPPED | OUTPATIENT
Start: 2024-12-06

## 2024-12-06 RX ORDER — BUPROPION HYDROCHLORIDE 150 MG/1
150 TABLET ORAL EVERY MORNING
Qty: 30 TABLET | Refills: 5 | Status: SHIPPED | OUTPATIENT
Start: 2024-12-06 | End: 2025-06-04

## 2024-12-06 NOTE — PROGRESS NOTES
Assessment/Plan:    Problem List Items Addressed This Visit       Anxiety and depression    Decrease Effexor back to 37.5 mg daily  Add Wellbutrin  mg daily  Follow-up in 6 weeks         Relevant Medications    venlafaxine (EFFEXOR-XR) 37.5 mg 24 hr capsule    buPROPion (WELLBUTRIN XL) 150 mg 24 hr tablet    Migraine without aura and without status migrainosus, not intractable    Controlled on Effexor XR         Relevant Medications    venlafaxine (EFFEXOR-XR) 37.5 mg 24 hr capsule    buPROPion (WELLBUTRIN XL) 150 mg 24 hr tablet    Severe major depressive disorder (HCC)    Decrease Effexor back to 37.5 mg daily  Add Wellbutrin  mg daily  Follow-up in 6 weeks         Relevant Medications    venlafaxine (EFFEXOR-XR) 37.5 mg 24 hr capsule    buPROPion (WELLBUTRIN XL) 150 mg 24 hr tablet    Chest pain - Primary    EKG normal sinus rhythm, normal EKG  Patient has chest tenderness and back tenderness at the same site that she is feeling the pain, most likely musculoskeletal as she also endorses doing recent exercise   Advised NSAIDs as needed         Relevant Orders    POCT ECG     Other Visit Diagnoses         Anxiety        Relevant Medications    venlafaxine (EFFEXOR-XR) 37.5 mg 24 hr capsule    buPROPion (WELLBUTRIN XL) 150 mg 24 hr tablet                 [employer] software was used to dictate this note.  It may contain errors with dictating incorrect words or incorrect spelling. Please contact the provider directly with any questions.    Subjective:      Patient ID: Anjelica Berger is a 19 y.o. female.    HPI    Patient presents today for 1 month follow up anxiety and depression.  We increased her Effexor to 75mg daily. She notes the depression is much better but she feels her anxiety worse. She feels she is now having anxiety daily. She has also been getting chest pain that radiates into her back between her shoulder blades.   Symptoms occur daily and lasts about 2 hours and then self resolves. Pain is  5/10 left upper chest and associated shortness of breath. She states she is feeling it currently. She states this only started since increasing her medication.   She denies any GERD     The following portions of the patient's history were reviewed and updated as appropriate: allergies, current medications, past family history, past medical history, past social history, past surgical history, and problem list.    Review of Systems   Constitutional:  Negative for activity change, appetite change and unexpected weight change.   Cardiovascular:  Positive for chest pain.   Psychiatric/Behavioral:  Negative for dysphoric mood. The patient is nervous/anxious.          Past Medical History:   Diagnosis Date    Allergic     Seasonal    Anxiety     Asthma          Current Outpatient Medications:     albuterol (PROVENTIL HFA,VENTOLIN HFA) 90 mcg/act inhaler, Inhale 2 puffs every 6 (six) hours as needed for wheezing, Disp: , Rfl:     buPROPion (WELLBUTRIN XL) 150 mg 24 hr tablet, Take 1 tablet (150 mg total) by mouth every morning, Disp: 30 tablet, Rfl: 5    hydrOXYzine HCL (ATARAX) 10 mg tablet, Take 1 tablet (10 mg total) by mouth every 6 (six) hours as needed for anxiety, Disp: 30 tablet, Rfl: 1    Junel 1/20 1-20 MG-MCG per tablet, Take 1 tablet by mouth daily, Disp: , Rfl:     ondansetron (ZOFRAN) 4 mg tablet, Take 1 tablet (4 mg total) by mouth every 8 (eight) hours as needed for nausea or vomiting, Disp: 20 tablet, Rfl: 0    venlafaxine (EFFEXOR-XR) 37.5 mg 24 hr capsule, Take 1 capsule (37.5 mg total) by mouth daily, Disp: 30 capsule, Rfl: 1    ZOLMitriptan (ZOMIG) 2.5 MG tablet, Take 1 tablet (2.5 mg total) by mouth once as needed for migraine may repeat after 2 hours, max 5mg per 24 hours, Disp: 10 tablet, Rfl: 1    No Known Allergies    Social History   History reviewed. No pertinent surgical history.  Family History   Problem Relation Age of Onset    Arthritis Mother         Diagnosed at 20    Migraines Mother      Hyperparathyroidism Father     Nephrolithiasis Father     Asthma Maternal Grandmother     Breast cancer Maternal Grandmother     Sjogren's syndrome Maternal Grandmother     Von Willebrand disease Maternal Grandmother     Asthma Paternal Grandmother     Autoimmune disease Paternal Grandmother     Sjogren's syndrome Paternal Grandmother     Fibromyalgia Paternal Grandmother     Diabetes Paternal Aunt         Pre-diabetic       Objective:  /80 (BP Location: Left arm, Patient Position: Sitting, Cuff Size: Adult)   Pulse 68   Temp 99.5 °F (37.5 °C)   SpO2 98%      Physical Exam  Vitals reviewed.   Constitutional:       General: She is not in acute distress.     Appearance: Normal appearance. She is not diaphoretic.   HENT:      Head: Normocephalic and atraumatic.   Eyes:      Extraocular Movements: Extraocular movements intact.      Conjunctiva/sclera: Conjunctivae normal.      Pupils: Pupils are equal, round, and reactive to light.   Cardiovascular:      Rate and Rhythm: Normal rate and regular rhythm.      Heart sounds: Normal heart sounds.   Pulmonary:      Effort: Pulmonary effort is normal. No respiratory distress.      Breath sounds: Normal breath sounds. No wheezing, rhonchi or rales.   Chest:      Chest wall: Tenderness present.   Musculoskeletal:      Thoracic back: Tenderness present. No bony tenderness.   Skin:     General: Skin is warm and dry.   Neurological:      Mental Status: She is alert and oriented to person, place, and time. Mental status is at baseline.   Psychiatric:         Mood and Affect: Mood normal.         Behavior: Behavior normal.

## 2024-12-06 NOTE — ASSESSMENT & PLAN NOTE
EKG normal sinus rhythm, normal EKG  Patient has chest tenderness and back tenderness at the same site that she is feeling the pain, most likely musculoskeletal as she also endorses doing recent exercise   Advised NSAIDs as needed

## 2024-12-28 DIAGNOSIS — G43.009 MIGRAINE WITHOUT AURA AND WITHOUT STATUS MIGRAINOSUS, NOT INTRACTABLE: ICD-10-CM

## 2024-12-28 DIAGNOSIS — F32.2 SEVERE MAJOR DEPRESSIVE DISORDER (HCC): ICD-10-CM

## 2024-12-28 DIAGNOSIS — F41.9 ANXIETY: ICD-10-CM

## 2024-12-29 RX ORDER — BUPROPION HYDROCHLORIDE 150 MG/1
150 TABLET ORAL EVERY MORNING
Qty: 90 TABLET | Refills: 2 | Status: SHIPPED | OUTPATIENT
Start: 2024-12-29

## 2024-12-29 RX ORDER — VENLAFAXINE HYDROCHLORIDE 37.5 MG/1
37.5 CAPSULE, EXTENDED RELEASE ORAL DAILY
Qty: 90 CAPSULE | Refills: 1 | Status: SHIPPED | OUTPATIENT
Start: 2024-12-29

## 2025-01-10 ENCOUNTER — OFFICE VISIT (OUTPATIENT)
Dept: INTERNAL MEDICINE CLINIC | Facility: OTHER | Age: 20
End: 2025-01-10
Payer: COMMERCIAL

## 2025-01-10 VITALS
WEIGHT: 138 LBS | HEART RATE: 84 BPM | BODY MASS INDEX: 24.45 KG/M2 | DIASTOLIC BLOOD PRESSURE: 78 MMHG | SYSTOLIC BLOOD PRESSURE: 100 MMHG | TEMPERATURE: 97.8 F | OXYGEN SATURATION: 98 % | HEIGHT: 63 IN

## 2025-01-10 DIAGNOSIS — Z13.220 SCREENING FOR LIPID DISORDERS: ICD-10-CM

## 2025-01-10 DIAGNOSIS — Z13.1 SCREENING FOR DIABETES MELLITUS: ICD-10-CM

## 2025-01-10 DIAGNOSIS — F41.9 ANXIETY AND DEPRESSION: Primary | ICD-10-CM

## 2025-01-10 DIAGNOSIS — J45.20 MILD INTERMITTENT ASTHMA WITHOUT COMPLICATION: ICD-10-CM

## 2025-01-10 DIAGNOSIS — F32.2 SEVERE MAJOR DEPRESSIVE DISORDER (HCC): ICD-10-CM

## 2025-01-10 DIAGNOSIS — Z13.0 SCREENING FOR DEFICIENCY ANEMIA: ICD-10-CM

## 2025-01-10 DIAGNOSIS — G43.009 MIGRAINE WITHOUT AURA AND WITHOUT STATUS MIGRAINOSUS, NOT INTRACTABLE: ICD-10-CM

## 2025-01-10 DIAGNOSIS — F32.A ANXIETY AND DEPRESSION: Primary | ICD-10-CM

## 2025-01-10 PROBLEM — R07.9 CHEST PAIN: Status: RESOLVED | Noted: 2024-12-06 | Resolved: 2025-01-10

## 2025-01-10 PROCEDURE — 99213 OFFICE O/P EST LOW 20 MIN: CPT | Performed by: NURSE PRACTITIONER

## 2025-01-10 NOTE — PROGRESS NOTES
Assessment/Plan:    Problem List Items Addressed This Visit       Asthma    - controlled with albuterol prn. Uses 15 min prior to exercise         Anxiety and depression - Primary    Controlled on current regimen  Continue Wellbutrin  mg daily and Effexor XR 37.5 mg daily         Relevant Orders    TSH, 3rd generation with Free T4 reflex    Migraine without aura and without status migrainosus, not intractable    Controlled on Effexor XR 37.5mg daily  Zomig as needed         Severe major depressive disorder (HCC)    Symptoms well-controlled on Wellbutrin  mg daily and Effexor XR 37.5 mg daily          Other Visit Diagnoses         Screening for diabetes mellitus        Relevant Orders    Comprehensive metabolic panel      Screening for lipid disorders        Relevant Orders    Lipid Panel with Direct LDL reflex      Screening for deficiency anemia        Relevant Orders    CBC and differential            BMI Counseling: Body mass index is 24.45 kg/m².          [email or web address] software was used to dictate this note.  It may contain errors with dictating incorrect words or incorrect spelling. Please contact the provider directly with any questions.    Subjective:      Patient ID: Anjelica Berger is a 19 y.o. female.    HPI    Patient presents today for 6 week follow up  Her chest pain is resolved  We decreased her effexor and started her on wellbutrin XL 150mg daily.  She feels her anxiety and depression are very well-controlled.  She really likes the current regimen she is on and feels that she barely has anxiety or depression.  Her headaches continue to be well-controlled.     Asthma is controlled with albuterol as needed.  Triggered by exercise and cold air   The following portions of the patient's history were reviewed and updated as appropriate: allergies, current medications, past family history, past medical history, past social history, past surgical history, and problem list.    Review of Systems    Constitutional:  Negative for chills, fever and unexpected weight change.   Respiratory:  Negative for cough, chest tightness, shortness of breath and wheezing.    Psychiatric/Behavioral:  Positive for dysphoric mood (very well controlled). Negative for self-injury and suicidal ideas. The patient is nervous/anxious (very well controlled).          Past Medical History:   Diagnosis Date    Allergic     Seasonal    Anxiety     Asthma          Current Outpatient Medications:     albuterol (PROVENTIL HFA,VENTOLIN HFA) 90 mcg/act inhaler, Inhale 2 puffs every 6 (six) hours as needed for wheezing, Disp: , Rfl:     buPROPion (WELLBUTRIN XL) 150 mg 24 hr tablet, TAKE 1 TABLET BY MOUTH EVERY DAY IN THE MORNING, Disp: 90 tablet, Rfl: 2    hydrOXYzine HCL (ATARAX) 10 mg tablet, Take 1 tablet (10 mg total) by mouth every 6 (six) hours as needed for anxiety, Disp: 30 tablet, Rfl: 1    Junel 1/20 1-20 MG-MCG per tablet, Take 1 tablet by mouth daily, Disp: , Rfl:     ondansetron (ZOFRAN) 4 mg tablet, Take 1 tablet (4 mg total) by mouth every 8 (eight) hours as needed for nausea or vomiting, Disp: 20 tablet, Rfl: 0    venlafaxine (EFFEXOR-XR) 37.5 mg 24 hr capsule, TAKE 1 CAPSULE BY MOUTH EVERY DAY, Disp: 90 capsule, Rfl: 1    ZOLMitriptan (ZOMIG) 2.5 MG tablet, Take 1 tablet (2.5 mg total) by mouth once as needed for migraine may repeat after 2 hours, max 5mg per 24 hours, Disp: 10 tablet, Rfl: 1    No Known Allergies    Social History   History reviewed. No pertinent surgical history.  Family History   Problem Relation Age of Onset    Arthritis Mother         Diagnosed at 20    Migraines Mother     Hyperparathyroidism Father     Nephrolithiasis Father     Asthma Maternal Grandmother     Breast cancer Maternal Grandmother     Sjogren's syndrome Maternal Grandmother     Von Willebrand disease Maternal Grandmother     Asthma Paternal Grandmother     Autoimmune disease Paternal Grandmother     Sjogren's syndrome Paternal  "Grandmother     Fibromyalgia Paternal Grandmother     Diabetes Paternal Aunt         Pre-diabetic       Objective:  /78 (BP Location: Left arm, Patient Position: Sitting, Cuff Size: Standard)   Pulse 84   Temp 97.8 °F (36.6 °C) (Temporal)   Ht 5' 3\" (1.6 m)   Wt 62.6 kg (138 lb)   SpO2 98%   BMI 24.45 kg/m²      Physical Exam  Vitals reviewed.   Constitutional:       General: She is not in acute distress.     Appearance: Normal appearance. She is normal weight. She is not diaphoretic.   HENT:      Head: Normocephalic and atraumatic.   Eyes:      Extraocular Movements: Extraocular movements intact.      Conjunctiva/sclera: Conjunctivae normal.      Pupils: Pupils are equal, round, and reactive to light.   Cardiovascular:      Rate and Rhythm: Normal rate and regular rhythm.      Heart sounds: Normal heart sounds. No murmur heard.  Pulmonary:      Effort: Pulmonary effort is normal. No respiratory distress.      Breath sounds: Normal breath sounds. No wheezing, rhonchi or rales.   Neurological:      Mental Status: She is alert and oriented to person, place, and time. Mental status is at baseline.   Psychiatric:         Mood and Affect: Mood normal.         Behavior: Behavior normal.         Thought Content: Thought content normal.         Judgment: Judgment normal.           "

## 2025-01-24 PROBLEM — J30.1 CHRONIC SEASONAL ALLERGIC RHINITIS DUE TO POLLEN: Status: ACTIVE | Noted: 2025-01-24

## 2025-01-24 PROBLEM — J30.89 ALLERGIC RHINITIS DUE TO MOLD: Status: ACTIVE | Noted: 2025-01-24

## 2025-01-24 PROBLEM — J30.81 ALLERGIC RHINITIS DUE TO ANIMAL (CAT) (DOG) HAIR AND DANDER: Status: ACTIVE | Noted: 2025-01-24

## 2025-03-03 PROCEDURE — G0145 SCR C/V CYTO,THINLAYER,RESCR: HCPCS | Performed by: PATHOLOGY

## 2025-03-03 PROCEDURE — 87624 HPV HI-RISK TYP POOLED RSLT: CPT | Performed by: OBSTETRICS & GYNECOLOGY

## 2025-03-05 ENCOUNTER — LAB REQUISITION (OUTPATIENT)
Dept: LAB | Facility: HOSPITAL | Age: 20
End: 2025-03-05
Payer: COMMERCIAL

## 2025-03-05 DIAGNOSIS — Z12.4 ENCOUNTER FOR SCREENING FOR MALIGNANT NEOPLASM OF CERVIX: ICD-10-CM

## 2025-03-05 DIAGNOSIS — Z12.72 ENCOUNTER FOR SCREENING FOR MALIGNANT NEOPLASM OF VAGINA: ICD-10-CM

## 2025-03-05 DIAGNOSIS — Z01.419 ENCOUNTER FOR GYNECOLOGICAL EXAMINATION (GENERAL) (ROUTINE) WITHOUT ABNORMAL FINDINGS: ICD-10-CM

## 2025-03-10 LAB
LAB AP GYN PRIMARY INTERPRETATION: ABNORMAL
LAB AP LMP: ABNORMAL
Lab: ABNORMAL
PATH INTERP SPEC-IMP: ABNORMAL

## 2025-03-10 PROCEDURE — G0124 SCREEN C/V THIN LAYER BY MD: HCPCS | Performed by: PATHOLOGY

## 2025-03-11 LAB
HPV HR 12 DNA CVX QL NAA+PROBE: NEGATIVE
HPV16 DNA CVX QL NAA+PROBE: NEGATIVE
HPV18 DNA CVX QL NAA+PROBE: NEGATIVE

## 2025-03-14 ENCOUNTER — APPOINTMENT (OUTPATIENT)
Dept: LAB | Age: 20
End: 2025-03-14

## 2025-03-14 DIAGNOSIS — Z00.6 ENCOUNTER FOR EXAMINATION FOR NORMAL COMPARISON OR CONTROL IN CLINICAL RESEARCH PROGRAM: ICD-10-CM

## 2025-03-14 PROCEDURE — 36415 COLL VENOUS BLD VENIPUNCTURE: CPT

## 2025-03-28 LAB
APOB+LDLR+PCSK9 GENE MUT ANL BLD/T: NOT DETECTED
BRCA1+BRCA2 DEL+DUP + FULL MUT ANL BLD/T: NOT DETECTED
MLH1+MSH2+MSH6+PMS2 GN DEL+DUP+FUL M: NOT DETECTED

## 2025-05-07 ENCOUNTER — OFFICE VISIT (OUTPATIENT)
Dept: INTERNAL MEDICINE CLINIC | Facility: OTHER | Age: 20
End: 2025-05-07
Payer: COMMERCIAL

## 2025-05-07 VITALS
HEART RATE: 111 BPM | SYSTOLIC BLOOD PRESSURE: 118 MMHG | WEIGHT: 150.6 LBS | BODY MASS INDEX: 26.68 KG/M2 | HEIGHT: 63 IN | OXYGEN SATURATION: 98 % | TEMPERATURE: 97.5 F | DIASTOLIC BLOOD PRESSURE: 80 MMHG

## 2025-05-07 DIAGNOSIS — J45.20 MILD INTERMITTENT ASTHMA WITHOUT COMPLICATION: Primary | ICD-10-CM

## 2025-05-07 DIAGNOSIS — R63.5 WEIGHT GAIN: ICD-10-CM

## 2025-05-07 PROCEDURE — 99213 OFFICE O/P EST LOW 20 MIN: CPT | Performed by: NURSE PRACTITIONER

## 2025-05-07 RX ORDER — ALBUTEROL SULFATE 90 UG/1
2 INHALANT RESPIRATORY (INHALATION) EVERY 6 HOURS PRN
Qty: 8 G | Refills: 2 | Status: SHIPPED | OUTPATIENT
Start: 2025-05-07

## 2025-05-07 NOTE — ASSESSMENT & PLAN NOTE
Continue albuterol prn, triggered by seasonal allergies  Continue zyrtec daily   Orders:    albuterol (PROVENTIL HFA,VENTOLIN HFA) 90 mcg/act inhaler; Inhale 2 puffs every 6 (six) hours as needed for wheezing

## 2025-05-07 NOTE — PROGRESS NOTES
Name: Anjelica Berger      : 2005      MRN: [id number]  Encounter Provider: LETY Garcia  Encounter Date: 2025   Encounter department: [facility] [place]  :  Assessment & Plan  Mild intermittent asthma without complication  Continue albuterol prn, triggered by seasonal allergies  Continue zyrtec daily   Orders:    albuterol (PROVENTIL HFA,VENTOLIN HFA) 90 mcg/act inhaler; Inhale 2 puffs every 6 (six) hours as needed for wheezing    Weight gain  Referral given to weight management and nutritionist   Discussed diet changes including aiming for at least 35g of protein with each meal, increase water intake to at least 80oz daily. Have a glass of water prior to each meal.   Healthy quick snacks such as hard boiled eggs, string cheese, vegetables.  Meal prep and planning for home cooked meals.   Increase physical activity, aim for 10k steps per day  Continue strength training exercises   Follow up in 8 weeks.  Consider topamax     Orders:    Ambulatory Referral to Nutrition Services; Future    Ambulatory Referral to Weight Management; Future    BMI 26.0-26.9,adult    Orders:    Ambulatory Referral to Nutrition Services; Future    Ambulatory Referral to Weight Management; Future           History of Present Illness   HPI    Patient presents today for refill of her albuterol. She was using it daily last week due to worsening allergies but improved this week. She continues clairitn daily.     Weight - she states she has a lot of food noise. She states she is always thinking about food and when the next time she is doing to eat. She tries to diet throughout the day by making healthier choices. She tracks her calories to about 1400 calories per day. She often binges at the end of the day and then exceeds her planned calories for the day.   She is exercising 3x a week. She does 3 separate days for arms, legs, and core. She gets about 6000 steps a day.       Review of  "Systems   Constitutional:  Positive for unexpected weight change (weight gain). Negative for activity change and appetite change.   Respiratory:  Negative for cough, chest tightness, shortness of breath and wheezing.        Objective   /80   Pulse (!) 111   Temp 97.5 °F (36.4 °C)   Ht 5' 3\" (1.6 m)   Wt 68.3 kg (150 lb 9.6 oz)   SpO2 98%   BMI 26.68 kg/m²      Physical Exam  Vitals reviewed.   Constitutional:       General: She is not in acute distress.     Appearance: Normal appearance. She is not diaphoretic.   HENT:      Head: Normocephalic and atraumatic.   Eyes:      Extraocular Movements: Extraocular movements intact.      Conjunctiva/sclera: Conjunctivae normal.      Pupils: Pupils are equal, round, and reactive to light.   Cardiovascular:      Rate and Rhythm: Normal rate and regular rhythm.      Heart sounds: Normal heart sounds. No murmur heard.  Pulmonary:      Effort: Pulmonary effort is normal. No respiratory distress.      Breath sounds: Normal breath sounds. No wheezing, rhonchi or rales.   Neurological:      Mental Status: She is alert and oriented to person, place, and time. Mental status is at baseline.   Psychiatric:         Mood and Affect: Mood normal.         Behavior: Behavior normal.         "

## 2025-05-12 ENCOUNTER — OFFICE VISIT (OUTPATIENT)
Dept: URGENT CARE | Age: 20
End: 2025-05-12
Payer: COMMERCIAL

## 2025-05-12 ENCOUNTER — APPOINTMENT (EMERGENCY)
Dept: ULTRASOUND IMAGING | Facility: HOSPITAL | Age: 20
End: 2025-05-12
Payer: COMMERCIAL

## 2025-05-12 ENCOUNTER — HOSPITAL ENCOUNTER (EMERGENCY)
Facility: HOSPITAL | Age: 20
Discharge: HOME | End: 2025-05-12
Attending: EMERGENCY MEDICINE
Payer: COMMERCIAL

## 2025-05-12 VITALS
HEART RATE: 80 BPM | SYSTOLIC BLOOD PRESSURE: 118 MMHG | OXYGEN SATURATION: 98 % | TEMPERATURE: 99.2 F | DIASTOLIC BLOOD PRESSURE: 75 MMHG | RESPIRATION RATE: 16 BRPM

## 2025-05-12 VITALS
OXYGEN SATURATION: 99 % | DIASTOLIC BLOOD PRESSURE: 74 MMHG | TEMPERATURE: 99.1 F | BODY MASS INDEX: 26.57 KG/M2 | RESPIRATION RATE: 18 BRPM | WEIGHT: 150 LBS | HEART RATE: 101 BPM | SYSTOLIC BLOOD PRESSURE: 112 MMHG

## 2025-05-12 DIAGNOSIS — R10.9 ACUTE ABDOMINAL PAIN: Primary | ICD-10-CM

## 2025-05-12 DIAGNOSIS — E86.0 DEHYDRATION: ICD-10-CM

## 2025-05-12 DIAGNOSIS — R11.0 NAUSEA: ICD-10-CM

## 2025-05-12 DIAGNOSIS — R11.0 NAUSEA: Primary | ICD-10-CM

## 2025-05-12 LAB
ALBUMIN SERPL BCG-MCNC: 4.7 G/DL (ref 3.5–5)
ALP SERPL-CCNC: 74 U/L (ref 34–104)
ALT SERPL W P-5'-P-CCNC: 45 U/L (ref 7–52)
ANION GAP SERPL CALCULATED.3IONS-SCNC: 8 MMOL/L (ref 4–13)
AST SERPL W P-5'-P-CCNC: 29 U/L (ref 13–39)
BASOPHILS # BLD AUTO: 0.03 THOUSANDS/ÂΜL (ref 0–0.1)
BASOPHILS NFR BLD AUTO: 0 % (ref 0–1)
BILIRUB SERPL-MCNC: 0.6 MG/DL (ref 0.2–1)
BILIRUB UR QL STRIP: NEGATIVE
BUN SERPL-MCNC: 11 MG/DL (ref 5–25)
CALCIUM SERPL-MCNC: 10 MG/DL (ref 8.4–10.2)
CHLORIDE SERPL-SCNC: 103 MMOL/L (ref 96–108)
CLARITY UR: CLEAR
CO2 SERPL-SCNC: 28 MMOL/L (ref 21–32)
COLOR UR: YELLOW
CREAT SERPL-MCNC: 1.32 MG/DL (ref 0.6–1.3)
EOSINOPHIL # BLD AUTO: 0.02 THOUSAND/ÂΜL (ref 0–0.61)
EOSINOPHIL NFR BLD AUTO: 0 % (ref 0–6)
ERYTHROCYTE [DISTWIDTH] IN BLOOD BY AUTOMATED COUNT: 11.7 % (ref 11.6–15.1)
EXT PREGNANCY TEST URINE: NEGATIVE
EXT. CONTROL: NORMAL
GFR SERPL CREATININE-BSD FRML MDRD: 57 ML/MIN/1.73SQ M
GLUCOSE SERPL-MCNC: 83 MG/DL (ref 65–140)
GLUCOSE UR STRIP-MCNC: NEGATIVE MG/DL
HCT VFR BLD AUTO: 44.4 % (ref 34.8–46.1)
HGB BLD-MCNC: 14.9 G/DL (ref 11.5–15.4)
HGB UR QL STRIP.AUTO: NEGATIVE
IMM GRANULOCYTES # BLD AUTO: 0.01 THOUSAND/UL (ref 0–0.2)
IMM GRANULOCYTES NFR BLD AUTO: 0 % (ref 0–2)
KETONES UR STRIP-MCNC: NEGATIVE MG/DL
LEUKOCYTE ESTERASE UR QL STRIP: NEGATIVE
LIPASE SERPL-CCNC: 25 U/L (ref 11–82)
LYMPHOCYTES # BLD AUTO: 1.16 THOUSANDS/ÂΜL (ref 0.6–4.47)
LYMPHOCYTES NFR BLD AUTO: 13 % (ref 14–44)
MCH RBC QN AUTO: 29.9 PG (ref 26.8–34.3)
MCHC RBC AUTO-ENTMCNC: 33.6 G/DL (ref 31.4–37.4)
MCV RBC AUTO: 89 FL (ref 82–98)
MONOCYTES # BLD AUTO: 0.66 THOUSAND/ÂΜL (ref 0.17–1.22)
MONOCYTES NFR BLD AUTO: 7 % (ref 4–12)
NEUTROPHILS # BLD AUTO: 7.4 THOUSANDS/ÂΜL (ref 1.85–7.62)
NEUTS SEG NFR BLD AUTO: 80 % (ref 43–75)
NITRITE UR QL STRIP: NEGATIVE
NRBC BLD AUTO-RTO: 0 /100 WBCS
PH UR STRIP.AUTO: 6 [PH] (ref 4.5–8)
PLATELET # BLD AUTO: 228 THOUSANDS/UL (ref 149–390)
PMV BLD AUTO: 9.1 FL (ref 8.9–12.7)
POTASSIUM SERPL-SCNC: 4.2 MMOL/L (ref 3.5–5.3)
PROT SERPL-MCNC: 8.4 G/DL (ref 6.4–8.4)
PROT UR STRIP-MCNC: NEGATIVE MG/DL
RBC # BLD AUTO: 4.99 MILLION/UL (ref 3.81–5.12)
SODIUM SERPL-SCNC: 139 MMOL/L (ref 135–147)
SP GR UR STRIP.AUTO: 1.01 (ref 1–1.03)
UROBILINOGEN UR QL STRIP.AUTO: 0.2 E.U./DL
WBC # BLD AUTO: 9.28 THOUSAND/UL (ref 4.31–10.16)

## 2025-05-12 PROCEDURE — G0382 LEV 3 HOSP TYPE B ED VISIT: HCPCS | Performed by: PHYSICIAN ASSISTANT

## 2025-05-12 PROCEDURE — 96375 TX/PRO/DX INJ NEW DRUG ADDON: CPT

## 2025-05-12 PROCEDURE — 99284 EMERGENCY DEPT VISIT MOD MDM: CPT

## 2025-05-12 PROCEDURE — 81025 URINE PREGNANCY TEST: CPT | Performed by: EMERGENCY MEDICINE

## 2025-05-12 PROCEDURE — 99284 EMERGENCY DEPT VISIT MOD MDM: CPT | Performed by: EMERGENCY MEDICINE

## 2025-05-12 PROCEDURE — 76856 US EXAM PELVIC COMPLETE: CPT

## 2025-05-12 PROCEDURE — 96374 THER/PROPH/DIAG INJ IV PUSH: CPT

## 2025-05-12 PROCEDURE — 83690 ASSAY OF LIPASE: CPT | Performed by: EMERGENCY MEDICINE

## 2025-05-12 PROCEDURE — 81003 URINALYSIS AUTO W/O SCOPE: CPT

## 2025-05-12 PROCEDURE — 80053 COMPREHEN METABOLIC PANEL: CPT | Performed by: EMERGENCY MEDICINE

## 2025-05-12 PROCEDURE — 36415 COLL VENOUS BLD VENIPUNCTURE: CPT | Performed by: EMERGENCY MEDICINE

## 2025-05-12 PROCEDURE — 85025 COMPLETE CBC W/AUTO DIFF WBC: CPT | Performed by: EMERGENCY MEDICINE

## 2025-05-12 PROCEDURE — S9083 URGENT CARE CENTER GLOBAL: HCPCS | Performed by: PHYSICIAN ASSISTANT

## 2025-05-12 PROCEDURE — 76830 TRANSVAGINAL US NON-OB: CPT

## 2025-05-12 PROCEDURE — 96361 HYDRATE IV INFUSION ADD-ON: CPT

## 2025-05-12 RX ORDER — KETOROLAC TROMETHAMINE 30 MG/ML
15 INJECTION, SOLUTION INTRAMUSCULAR; INTRAVENOUS ONCE
Status: COMPLETED | OUTPATIENT
Start: 2025-05-12 | End: 2025-05-12

## 2025-05-12 RX ORDER — ONDANSETRON 4 MG/1
4 TABLET, FILM COATED ORAL EVERY 8 HOURS PRN
Qty: 7 TABLET | Refills: 0 | Status: SHIPPED | OUTPATIENT
Start: 2025-05-12

## 2025-05-12 RX ORDER — HYOSCYAMINE SULFATE 0.12 MG/1
0.12 TABLET ORAL EVERY 4 HOURS PRN
Qty: 30 TABLET | Refills: 0 | Status: SHIPPED | OUTPATIENT
Start: 2025-05-12

## 2025-05-12 RX ORDER — ONDANSETRON 4 MG/1
4 TABLET, FILM COATED ORAL EVERY 8 HOURS PRN
Qty: 20 TABLET | Refills: 0 | Status: SHIPPED | OUTPATIENT
Start: 2025-05-12

## 2025-05-12 RX ORDER — ONDANSETRON 2 MG/ML
4 INJECTION INTRAMUSCULAR; INTRAVENOUS ONCE
Status: COMPLETED | OUTPATIENT
Start: 2025-05-12 | End: 2025-05-12

## 2025-05-12 RX ADMIN — SODIUM CHLORIDE 1000 ML: 0.9 INJECTION, SOLUTION INTRAVENOUS at 14:15

## 2025-05-12 RX ADMIN — ONDANSETRON 4 MG: 2 INJECTION INTRAMUSCULAR; INTRAVENOUS at 14:14

## 2025-05-12 RX ADMIN — KETOROLAC TROMETHAMINE 15 MG: 30 INJECTION, SOLUTION INTRAMUSCULAR; INTRAVENOUS at 14:15

## 2025-05-12 NOTE — PATIENT INSTRUCTIONS
Patient was educated on bland diet. Educated on Zofran.    Any increased abdomen pain, fever or visual changes go to ED.    Any chest pain or shortness of breath go to ED.    Follow up with PCP.

## 2025-05-12 NOTE — Clinical Note
Anjelica Berger was seen and treated in our emergency department on 5/12/2025.    No restrictions            Diagnosis:     Anjelica  may return to work on return date.    She may return on this date: 05/14/2025         If you have any questions or concerns, please don't hesitate to call.      Veto Park MD    ______________________________           _______________          _______________  Hospital Representative                              Date                                Time

## 2025-05-12 NOTE — PROGRESS NOTES
[facility]        NAME: Anjelica Berger is a 20 y.o. female  : 2005    MRN: [id number]  DATE: May 12, 2025  TIME: 11:01 AM    Assessment and Plan   Nausea [R11.0]  1. Nausea  ondansetron (ZOFRAN) 4 mg tablet          Patient was told if headache is not well controlled or getting worst and abdomen pain is persistent I highly recommend going to ED. At this time patient reports she would like to try zofran for nausea. If no improvement go to ED.  Patient Instructions   Patient was educated on bland diet. Educated on Zofran.    Any increased abdomen pain, fever or visual changes go to ED.    Any chest pain or shortness of breath go to ED.    Follow up with PCP.     Follow up with PCP in 3-5 days.  Proceed to  ER if symptoms worsen.    If tests have been performed at [facility], our office will contact you with results if changes need to be made to the care plan discussed with you at the visit.  You can review your full results on [facility].    Chief Complaint     Chief Complaint   Patient presents with    Headache     Pt started last freddie at 10:30 with migraine headache, light sensitivity, nausea and dry heaves.  Took two Zomig tablets this morning without relief.  Also began with low abdominal pain at 6am.  Denies UTI sxs.  LMP .           History of Present Illness       Patient presents to the urgent care complaining of headache and lower abdomen pain. Patient reports history of ocular migraines. Patient reports ocular migraine started last night at 10:30 with visual changes. Patient took Zomig one dose and went to bed and reports visual changes subsided. Patient took another dose of Zomig this morning at 10:00 am with minimal relief. Patient is now also reporting lower abdomen pain, dry heaving and nausea since 10 am. Denies any blood in urine or stool. Denies any diarrhea. Patient reports last BM was last night. Denies any history of colitis, IBS or diverticulitis. LMP was on 25.  Denies any history of ovarian cyst. Denies any known fevers. Denies any allergies to medications.         Review of Systems   Review of Systems   Constitutional: Negative.    HENT: Negative.     Gastrointestinal:  Positive for abdominal pain and nausea. Negative for blood in stool and vomiting.   Genitourinary:  Negative for frequency, hematuria and urgency.   Neurological:  Positive for headaches.   Psychiatric/Behavioral: Negative.           Current Medications       Current Outpatient Medications:     albuterol (PROVENTIL HFA,VENTOLIN HFA) 90 mcg/act inhaler, Inhale 2 puffs every 6 (six) hours as needed for wheezing, Disp: 8 g, Rfl: 2    buPROPion (WELLBUTRIN XL) 150 mg 24 hr tablet, TAKE 1 TABLET BY MOUTH EVERY DAY IN THE MORNING, Disp: 90 tablet, Rfl: 2    cetirizine (ZyrTEC) 10 mg tablet, TAKE 1 TABLET (10 MG TOTAL) BY MOUTH EVERY EVENING  DOSES, Disp: 90 tablet, Rfl: 4    hydrOXYzine HCL (ATARAX) 10 mg tablet, Take 1 tablet (10 mg total) by mouth every 6 (six) hours as needed for anxiety, Disp: 30 tablet, Rfl: 1    omalizumab (XOLAIR) subcutaneous injection, Inject 1 mL (150 mg total) under the skin every 28 days, Disp: , Rfl:     ondansetron (ZOFRAN) 4 mg tablet, Take 1 tablet (4 mg total) by mouth every 8 (eight) hours as needed for nausea or vomiting, Disp: 20 tablet, Rfl: 0    venlafaxine (EFFEXOR-XR) 37.5 mg 24 hr capsule, TAKE 1 CAPSULE BY MOUTH EVERY DAY, Disp: 90 capsule, Rfl: 1    ZOLMitriptan (ZOMIG) 2.5 MG tablet, Take 1 tablet (2.5 mg total) by mouth once as needed for migraine may repeat after 2 hours, max 5mg per 24 hours, Disp: 10 tablet, Rfl: 1    ondansetron (ZOFRAN) 4 mg tablet, Take 1 tablet (4 mg total) by mouth every 8 (eight) hours as needed for nausea or vomiting (Patient not taking: Reported on 5/12/2025), Disp: 20 tablet, Rfl: 0    Current Allergies     Allergies as of 05/12/2025    (No Known Allergies)            The following portions of the patient's history were reviewed  and updated as appropriate: allergies, current medications, past family history, past medical history, past social history, past surgical history and problem list.     Past Medical History:   Diagnosis Date    Allergic     Seasonal    Anxiety     Asthma     Depression     Headache     History of chicken pox     Hives     Migraines     Wears glasses        Past Surgical History:   Procedure Laterality Date    WISDOM TOOTH EXTRACTION  06/02/2023       Family History   Problem Relation Age of Onset    Arthritis Mother         Diagnosed at 20    Migraines Mother     Allergies Mother     Hyperparathyroidism Father     Nephrolithiasis Father     No Known Problems Brother     Asthma Maternal Grandmother     Breast cancer Maternal Grandmother     Sjogren's syndrome Maternal Grandmother     Von Willebrand disease Maternal Grandmother     Asthma Paternal Grandmother     Autoimmune disease Paternal Grandmother     Sjogren's syndrome Paternal Grandmother     Fibromyalgia Paternal Grandmother     Diabetes Paternal Aunt         Pre-diabetic         Medications have been verified.        Objective   /74   Pulse 101   Temp 99.1 °F (37.3 °C)   Resp 18   Wt 68 kg (150 lb)   SpO2 99%   BMI 26.57 kg/m²   No LMP recorded.       Physical Exam     Physical Exam  Vitals and nursing note reviewed.   Constitutional:       Appearance: Normal appearance.   HENT:      Head: Normocephalic.      Right Ear: Tympanic membrane, ear canal and external ear normal.      Left Ear: Tympanic membrane, ear canal and external ear normal.      Mouth/Throat:      Mouth: Mucous membranes are moist.   Eyes:      Extraocular Movements: Extraocular movements intact.      Pupils: Pupils are equal, round, and reactive to light.   Cardiovascular:      Rate and Rhythm: Normal rate and regular rhythm.      Heart sounds: Normal heart sounds.   Pulmonary:      Breath sounds: Normal breath sounds. No wheezing.   Abdominal:      Palpations: Abdomen is soft.       Tenderness: There is no right CVA tenderness or left CVA tenderness.      Comments: Diffuse lower abdomen tenderness   Neurological:      General: No focal deficit present.      Mental Status: She is alert and oriented to person, place, and time.   Psychiatric:         Mood and Affect: Mood normal.         Behavior: Behavior normal.

## 2025-05-12 NOTE — ED PROVIDER NOTES
Time reflects when diagnosis was documented in both MDM as applicable and the Disposition within this note       Time User Action Codes Description Comment    5/12/2025  4:10 PM Veto Park Add [R10.9] Acute abdominal pain     5/12/2025  4:10 PM Veto Park Add [R11.0] Nausea     5/12/2025  4:10 PM Veto Park Add [E86.0] Dehydration           ED Disposition       ED Disposition   Discharge    Condition   Stable    Date/Time   Mon May 12, 2025  4:10 PM    Comment   Anjelica Berger discharge to home/self care.                   Assessment & Plan       Medical Decision Making  Left lower quadrant abdominal pain radiating to the back-will do urine dip to rule out UTI, pyelonephritis, kidney stone, urine pregnancy to rule pregnancy complication, abdominal labs throughout hepatitis, pancreatitis and biliary pathology, pelvic ultrasound right ovarian cyst/torsion, doubt acute appendicitis, perforated viscus and CT abdomen is indicated.    Amount and/or Complexity of Data Reviewed  Labs: ordered. Decision-making details documented in ED Course.  Radiology: ordered.    Risk  Prescription drug management.        ED Course as of 05/12/25 1611   Mon May 12, 2025   1508 Creatinine(!): 1.32  Treated with ivfs     1610 Medical workup reviewed and benign.  Patient's dehydration was treated with IV fluids, symptoms have improved.  Likely acute enteritis will treat symptoms, outpatient follow-up       Medications   sodium chloride 0.9 % bolus 1,000 mL (1,000 mL Intravenous New Bag 5/12/25 1415)   ondansetron (ZOFRAN) injection 4 mg (4 mg Intravenous Given 5/12/25 1414)   ketorolac (TORADOL) injection 15 mg (15 mg Intravenous Given 5/12/25 1415)       ED Risk Strat Scores                    No data recorded        SBIRT 22yo+      Flowsheet Row Most Recent Value   Initial Alcohol Screen: US AUDIT-C     1. How often do you have a drink containing alcohol? 0 Filed at: 05/12/2025 1420   2. How many drinks containing alcohol do you  have on a typical day you are drinking?  0 Filed at: 05/12/2025 1420   3b. FEMALE Any Age, or MALE 65+: How often do you have 4 or more drinks on one occassion? 0 Filed at: 05/12/2025 1420   Audit-C Score 0 Filed at: 05/12/2025 1420   CHEYANNE: How many times in the past year have you...    Used an illegal drug or used a prescription medication for non-medical reasons? Never Filed at: 05/12/2025 1420                            History of Present Illness       Chief Complaint   Patient presents with    Abdominal Pain     Pt reports abd pain and nausea.        Past Medical History:   Diagnosis Date    Allergic     Seasonal    Anxiety     Asthma     Depression     Headache     History of chicken pox     Hives     Migraines     Wears glasses       Past Surgical History:   Procedure Laterality Date    WISDOM TOOTH EXTRACTION  06/02/2023      Family History   Problem Relation Age of Onset    Arthritis Mother         Diagnosed at 20    Migraines Mother     Allergies Mother     Hyperparathyroidism Father     Nephrolithiasis Father     No Known Problems Brother     Asthma Maternal Grandmother     Breast cancer Maternal Grandmother     Sjogren's syndrome Maternal Grandmother     Von Willebrand disease Maternal Grandmother     Asthma Paternal Grandmother     Autoimmune disease Paternal Grandmother     Sjogren's syndrome Paternal Grandmother     Fibromyalgia Paternal Grandmother     Diabetes Paternal Aunt         Pre-diabetic      Social History     Tobacco Use    Smoking status: Never    Smokeless tobacco: Never   Vaping Use    Vaping status: Never Used   Substance Use Topics    Alcohol use: Yes     Alcohol/week: 2.0 standard drinks of alcohol     Types: 2 Standard drinks or equivalent per week     Comment: occasionally    Drug use: Never     Types: Marijuana      E-Cigarette/Vaping    E-Cigarette Use Never User       E-Cigarette/Vaping Substances    Nicotine No     THC No     CBD No     Flavoring No     Other No     Unknown No        I have reviewed and agree with the history as documented.     20-year-old female presents for evaluation of sudden onset of sharp lower abdominal pain rating through to her back which is a day progressed has now settled in her left lower quadrant and still rates through to her back.  Patient denies modifying factors.  Associated with nausea, no vomiting, fevers, chills, anorexia, diarrhea, constipation, urinary complaints, vaginal bleeding or discharge.  Patient reports her last menstrual period was 2 weeks ago.      History provided by:  Patient  Abdominal Pain  Associated symptoms: nausea    Associated symptoms: no chest pain, no constipation, no diarrhea, no dysuria, no fatigue, no fever, no hematuria, no shortness of breath, no sore throat, no vaginal bleeding, no vaginal discharge and no vomiting        Review of Systems   Constitutional:  Negative for activity change, appetite change, fatigue and fever.   HENT:  Negative for congestion, dental problem, ear pain, rhinorrhea and sore throat.    Eyes:  Negative for pain and redness.   Respiratory:  Negative for chest tightness, shortness of breath and wheezing.    Cardiovascular:  Negative for chest pain and palpitations.   Gastrointestinal:  Positive for abdominal pain and nausea. Negative for blood in stool, constipation, diarrhea and vomiting.   Endocrine: Negative for cold intolerance and heat intolerance.   Genitourinary:  Negative for decreased urine volume, difficulty urinating, dysuria, flank pain, frequency, hematuria, menstrual problem, pelvic pain, vaginal bleeding, vaginal discharge and vaginal pain.   Musculoskeletal:  Negative for arthralgias and myalgias.   Skin:  Negative for color change, pallor and rash.   Neurological:  Negative for weakness and numbness.   Hematological:  Does not bruise/bleed easily.   Psychiatric/Behavioral:  Negative for agitation, hallucinations and suicidal ideas.            Objective       ED Triage Vitals    Temperature Pulse Blood Pressure Respirations SpO2 Patient Position - Orthostatic VS   05/12/25 1205 05/12/25 1205 05/12/25 1205 05/12/25 1205 05/12/25 1205 05/12/25 1507   99.2 °F (37.3 °C) (!) 115 142/65 18 98 % Lying      Temp Source Heart Rate Source BP Location FiO2 (%) Pain Score    05/12/25 1205 05/12/25 1205 05/12/25 1205 -- 05/12/25 1415    Oral Monitor Right arm  4      Vitals      Date and Time Temp Pulse SpO2 Resp BP Pain Score FACES Pain Rating User   05/12/25 1507 -- 80 98 % 16 118/75 4 -- AB   05/12/25 1420 -- -- -- -- -- 4 -- AB   05/12/25 1415 -- -- -- -- -- 4 -- AB   05/12/25 1205 99.2 °F (37.3 °C) 115 98 % 18 142/65 -- -- HMR            Physical Exam  Constitutional:       Appearance: She is well-developed.   HENT:      Head: Normocephalic and atraumatic.   Eyes:      Pupils: Pupils are equal, round, and reactive to light.   Neck:      Vascular: No JVD.      Trachea: No tracheal deviation.   Cardiovascular:      Rate and Rhythm: Normal rate and regular rhythm.   Pulmonary:      Effort: Pulmonary effort is normal. No tachypnea, accessory muscle usage or respiratory distress.      Breath sounds: Normal breath sounds.   Abdominal:      General: There is no distension.      Tenderness: There is abdominal tenderness in the left lower quadrant. There is no right CVA tenderness, left CVA tenderness, guarding or rebound.   Musculoskeletal:      Right lower leg: Normal.      Left lower leg: Normal.   Skin:     General: Skin is warm.      Capillary Refill: Capillary refill takes less than 2 seconds.   Neurological:      Mental Status: She is alert and oriented to person, place, and time.   Psychiatric:         Behavior: Behavior normal.         Results Reviewed       Procedure Component Value Units Date/Time    Lipase [[id number]]  (Normal) Collected: 05/12/25 1414    Lab Status: Final result Specimen: Blood from Arm, Left Updated: 05/12/25 1442     Lipase 25 u/L     Comprehensive metabolic panel  [[id number]]  (Abnormal) Collected: 05/12/25 1414    Lab Status: Final result Specimen: Blood from Arm, Left Updated: 05/12/25 1442     Sodium 139 mmol/L      Potassium 4.2 mmol/L      Chloride 103 mmol/L      CO2 28 mmol/L      ANION GAP 8 mmol/L      BUN 11 mg/dL      Creatinine 1.32 mg/dL      Glucose 83 mg/dL      Calcium 10.0 mg/dL      AST 29 U/L      ALT 45 U/L      Alkaline Phosphatase 74 U/L      Total Protein 8.4 g/dL      Albumin 4.7 g/dL      Total Bilirubin 0.60 mg/dL      eGFR 57 ml/min/1.73sq m     Narrative:      National Kid[employer] Disease Foundation guidelines for Chronic Kidney Disease (CKD):     Stage 1 with normal or high GFR (GFR > 90 mL/min/1.73 square meters)    Stage 2 Mild CKD (GFR = 60-89 mL/min/1.73 square meters)    Stage 3A Moderate CKD (GFR = 45-59 mL/min/1.73 square meters)    Stage 3B Moderate CKD (GFR = 30-44 mL/min/1.73 square meters)    Stage 4 Severe CKD (GFR = 15-29 mL/min/1.73 square meters)    Stage 5 End Stage CKD (GFR <15 mL/min/1.73 square meters)  Note: GFR calculation is accurate only with a steady state creatinine    CBC and differential [[id number]]  (Abnormal) Collected: 05/12/25 1414    Lab Status: Final result Specimen: Blood from Arm, Left Updated: 05/12/25 1423     WBC 9.28 Thousand/uL      RBC 4.99 Million/uL      Hemoglobin 14.9 g/dL      Hematocrit 44.4 %      MCV 89 fL      MCH 29.9 pg      MCHC 33.6 g/dL      RDW 11.7 %      MPV 9.1 fL      Platelets 228 Thousands/uL      nRBC 0 /100 WBCs      Segmented % 80 %      Immature Grans % 0 %      Lymphocytes % 13 %      Monocytes % 7 %      Eosinophils Relative 0 %      Basophils Relative 0 %      Absolute Neutrophils 7.40 Thousands/µL      Absolute Immature Grans 0.01 Thousand/uL      Absolute Lymphocytes 1.16 Thousands/µL      Absolute Monocytes 0.66 Thousand/µL      Eosinophils Absolute 0.02 Thousand/µL      Basophils Absolute 0.03 Thousands/µL     POCT pregnancy, urine [[id number]]  (Normal) Collected: 05/12/25 1406     Lab Status: Final result Updated: 05/12/25 1406     EXT Preg Test, Ur Negative     Control Valid    Urine Macroscopic, POC [[id number]] Collected: 05/12/25 1404    Lab Status: Final result Specimen: Urine Updated: 05/12/25 1406     Color, UA Yellow     Clarity, UA Clear     pH, UA 6.0     Leukocytes, UA Negative     Nitrite, UA Negative     Protein, UA Negative mg/dl      Glucose, UA Negative mg/dl      Ketones, UA Negative mg/dl      Urobilinogen, UA 0.2 E.U./dl      Bilirubin, UA Negative     Occult Blood, UA Negative     Specific Gravity, UA 1.010    Narrative:      CLINITEK RESULT            US pelvis complete w transvaginal   Final Interpretation by Raffy Laguna MD (05/12 2050)      Normal exam.                           Workstation performed: [id number]             Procedures    ED Medication and Procedure Management   Prior to Admission Medications   Prescriptions Last Dose Informant Patient Reported? Taking?   ZOLMitriptan (ZOMIG) 2.5 MG tablet  Self No No   Sig: Take 1 tablet (2.5 mg total) by mouth once as needed for migraine may repeat after 2 hours, max 5mg per 24 hours   albuterol (PROVENTIL HFA,VENTOLIN HFA) 90 mcg/act inhaler   No No   Sig: Inhale 2 puffs every 6 (six) hours as needed for wheezing   buPROPion (WELLBUTRIN XL) 150 mg 24 hr tablet   No No   Sig: TAKE 1 TABLET BY MOUTH EVERY DAY IN THE MORNING   cetirizine (ZyrTEC) 10 mg tablet   No No   Sig: TAKE 1 TABLET (10 MG TOTAL) BY MOUTH EVERY EVENING  DOSES   hydrOXYzine HCL (ATARAX) 10 mg tablet  Self No No   Sig: Take 1 tablet (10 mg total) by mouth every 6 (six) hours as needed for anxiety   omalizumab (XOLAIR) subcutaneous injection   No No   Sig: Inject 1 mL (150 mg total) under the skin every 28 days   ondansetron (ZOFRAN) 4 mg tablet  Self No No   Sig: Take 1 tablet (4 mg total) by mouth every 8 (eight) hours as needed for nausea or vomiting   Patient not taking: Reported on 5/12/2025   ondansetron (ZOFRAN) 4 mg  tablet   No No   Sig: Take 1 tablet (4 mg total) by mouth every 8 (eight) hours as needed for nausea or vomiting   venlafaxine (EFFEXOR-XR) 37.5 mg 24 hr capsule   No No   Sig: TAKE 1 CAPSULE BY MOUTH EVERY DAY      Facility-Administered Medications: None     Patient's Medications   Discharge Prescriptions    HYOSCYAMINE (ANASPAZ,LEVSIN) 0.125 MG TABLET    Take 1 tablet (0.125 mg total) by mouth every 4 (four) hours as needed for cramping       Start Date: 5/12/2025 End Date: --       Order Dose: 0.125 mg       Quantity: 30 tablet    Refills: 0    ONDANSETRON (ZOFRAN) 4 MG TABLET    Take 1 tablet (4 mg total) by mouth every 8 (eight) hours as needed for nausea or vomiting for up to 7 doses       Start Date: 5/12/2025 End Date: --       Order Dose: 4 mg       Quantity: 7 tablet    Refills: 0     No discharge procedures on file.  ED SEPSIS DOCUMENTATION   Time reflects when diagnosis was documented in both MDM as applicable and the Disposition within this note       Time User Action Codes Description Comment    5/12/2025  4:10 PM Veto Park [R10.9] Acute abdominal pain     5/12/2025  4:10 PM Veto Park [R11.0] Nausea     5/12/2025  4:10 PM Veto Park [E86.0] Dehydration                  Veto Park MD  05/12/25 8097

## 2025-05-13 NOTE — PROGRESS NOTES
Weight Management Medical Nutrition Assessment  Anjelica presented for a meal planning session. Today's weight is 148.6#. Notes weight gain concerns and strong cravings that started 2 weeks ago. In nursing program and working. Lives in an apartment with 2 roommates and has access to full kitchen. Per dietary recall snacking mid-day has become a habit. She feels hungry often. She is sensitive to textures of fruits and vegetables therefore she avoids most unless in smoothie or sauteed. Protein intake inadequate which can lead to hunger. We reviewed meal ideas, recipe resources, and ways to meet protein goal. We developed and reviewed a low calorie meal plan.      Patient seen by Medical Provider in past 6 months:  no  Requested to schedule appointment with Medical Provider: No      Anthropometric Measurements  Start Weight (#) & Date: 148.6# 5/15/25  Current Weight (#): 148.6#  TBW % Change from start weight: n/a  Ideal Body Weight (#): 115#   Goal Weight (#): 135#  Highest: 148#  Lowest: 115#    Weight Loss History  Previous weight loss attempts: no previous attempts    Food and Nutrition Related History  Wake up: 9AM   Bed Time: 12AM    Food Recall  Breakfast: 10-11AM pancake or leftovers from night before (hamburger helper)    Snack: skip  Lunch: 1PM Kraft mac & cheese OR 2 slices white bread, peanut butter, and jelly + cheese stick OR cheese stick + chips + uncrustable   Snack: 2PM Doritos OR sea salt/lime chips  Dinner: 4:30-9PM Kraft mac & cheese OR sandwich OR Hamburger [place] OR Smart Ones meal  Snack: skip    Beverages: 64oz water, Bloom or 8oz Red Bull energy drink, 1 glass of wine socially  Volume of beverage intake: see above    Weekends: has clinicals on weekends and packs snacks  Cravings: chips or sweets such as cake and baked goods  Trouble area of day: morning and 2PM    Frequency of Eating out: not at all  Food restrictions: avoids raw fruit, vegetables  Cooking: self   Food Shopping: self    Physical  Activity Intake  Activity: none currently   Frequency: none currently  Physical limitations/barriers to exercise: in school    Estimated Needs  Energy  SECA: BMR: n/a      X 1.3 -1000 =  [employer] Needs: BMR : 1403   0.5-1# loss weekly sedentary: 2954-5235            0.5-1# loss weekly lightly active: 2472-5447  Maintenance calories for sedentary activity level: 1684  Protein: 75-90     (1.2-1.5g/kg IBW)  Fluid Requirement Calculator   Total Fluid: 83   oz  ([place]-Segar Method)  Free Fluid: 67 oz ([place]-Segar Method - 20%)    Nutrition Diagnosis  Yes;    Overweight/obesity  related to Food and nutrition related knowledge deficit as evidenced by  BMI more than normative standard for age and sex (overweight 25-29.9)       Nutrition Intervention    Nutrition Prescription  Calories: 9404-9861  Protein: 75-90  Fluid: 67    Meal Plan (Blaze/Pro/Carb)  Breakfast: 300-500/25  Snack:  Lunch: 400-500/25  Snack: 200/10-15  Dinner: 400-500/25  Snack:    Nutrition Education:    Calorie controlled menu  Lean protein food choices  Healthy snack options  Food journaling tips      Nutrition Counseling:  Strategies: meal planning, portion sizes, healthy snack choices, hydration, fiber intake, protein intake, exercise, food journal      Monitoring and Evaluation:  Evaluation criteria:  Energy Intake  Meet protein needs  Maintain adequate hydration  Monitor weekly weight  Meal planning/preparation  Food journal   Decreased portions at mealtimes and snacks  Physical activity     Barriers to learning:none  Readiness to change: Preparation:  (Getting ready to change)   Comprehension: very good  Expected Compliance: very good

## 2025-05-15 ENCOUNTER — CLINICAL SUPPORT (OUTPATIENT)
Dept: BARIATRICS | Facility: CLINIC | Age: 20
End: 2025-05-15

## 2025-05-15 VITALS — BODY MASS INDEX: 26.33 KG/M2 | HEIGHT: 63 IN | WEIGHT: 148.6 LBS

## 2025-05-15 DIAGNOSIS — R63.5 ABNORMAL WEIGHT GAIN: Primary | ICD-10-CM

## 2025-05-15 PROCEDURE — RECHECK

## 2025-05-15 PROCEDURE — WMDI30

## 2025-06-10 ENCOUNTER — APPOINTMENT (OUTPATIENT)
Dept: LAB | Age: 20
End: 2025-06-10
Payer: COMMERCIAL

## 2025-06-10 DIAGNOSIS — F41.9 ANXIETY AND DEPRESSION: ICD-10-CM

## 2025-06-10 DIAGNOSIS — F32.A ANXIETY AND DEPRESSION: ICD-10-CM

## 2025-06-10 DIAGNOSIS — Z13.220 SCREENING FOR LIPID DISORDERS: ICD-10-CM

## 2025-06-10 DIAGNOSIS — Z13.0 SCREENING FOR DEFICIENCY ANEMIA: ICD-10-CM

## 2025-06-10 DIAGNOSIS — Z13.1 SCREENING FOR DIABETES MELLITUS: ICD-10-CM

## 2025-06-10 LAB
ALBUMIN SERPL BCG-MCNC: 4.1 G/DL (ref 3.5–5)
ALP SERPL-CCNC: 63 U/L (ref 34–104)
ALT SERPL W P-5'-P-CCNC: 25 U/L (ref 7–52)
ANION GAP SERPL CALCULATED.3IONS-SCNC: 9 MMOL/L (ref 4–13)
AST SERPL W P-5'-P-CCNC: 19 U/L (ref 13–39)
BASOPHILS # BLD AUTO: 0.02 THOUSANDS/ÂΜL (ref 0–0.1)
BASOPHILS NFR BLD AUTO: 0 % (ref 0–1)
BILIRUB SERPL-MCNC: 0.42 MG/DL (ref 0.2–1)
BUN SERPL-MCNC: 9 MG/DL (ref 5–25)
CALCIUM SERPL-MCNC: 9.1 MG/DL (ref 8.4–10.2)
CHLORIDE SERPL-SCNC: 104 MMOL/L (ref 96–108)
CHOLEST SERPL-MCNC: 164 MG/DL (ref ?–200)
CO2 SERPL-SCNC: 25 MMOL/L (ref 21–32)
CREAT SERPL-MCNC: 0.82 MG/DL (ref 0.6–1.3)
EOSINOPHIL # BLD AUTO: 0.04 THOUSAND/ÂΜL (ref 0–0.61)
EOSINOPHIL NFR BLD AUTO: 1 % (ref 0–6)
ERYTHROCYTE [DISTWIDTH] IN BLOOD BY AUTOMATED COUNT: 12.3 % (ref 11.6–15.1)
GFR SERPL CREATININE-BSD FRML MDRD: 101 ML/MIN/1.73SQ M
GLUCOSE P FAST SERPL-MCNC: 82 MG/DL (ref 65–99)
HCT VFR BLD AUTO: 37.8 % (ref 34.8–46.1)
HDLC SERPL-MCNC: 50 MG/DL
HGB BLD-MCNC: 12.5 G/DL (ref 11.5–15.4)
IMM GRANULOCYTES # BLD AUTO: 0.01 THOUSAND/UL (ref 0–0.2)
IMM GRANULOCYTES NFR BLD AUTO: 0 % (ref 0–2)
LDLC SERPL CALC-MCNC: 93 MG/DL (ref 0–100)
LYMPHOCYTES # BLD AUTO: 1.84 THOUSANDS/ÂΜL (ref 0.6–4.47)
LYMPHOCYTES NFR BLD AUTO: 31 % (ref 14–44)
MCH RBC QN AUTO: 29.6 PG (ref 26.8–34.3)
MCHC RBC AUTO-ENTMCNC: 33.1 G/DL (ref 31.4–37.4)
MCV RBC AUTO: 90 FL (ref 82–98)
MONOCYTES # BLD AUTO: 0.49 THOUSAND/ÂΜL (ref 0.17–1.22)
MONOCYTES NFR BLD AUTO: 8 % (ref 4–12)
NEUTROPHILS # BLD AUTO: 3.57 THOUSANDS/ÂΜL (ref 1.85–7.62)
NEUTS SEG NFR BLD AUTO: 60 % (ref 43–75)
NRBC BLD AUTO-RTO: 0 /100 WBCS
PLATELET # BLD AUTO: 226 THOUSANDS/UL (ref 149–390)
PMV BLD AUTO: 9.4 FL (ref 8.9–12.7)
POTASSIUM SERPL-SCNC: 4.5 MMOL/L (ref 3.5–5.3)
PROT SERPL-MCNC: 7.3 G/DL (ref 6.4–8.4)
RBC # BLD AUTO: 4.22 MILLION/UL (ref 3.81–5.12)
SODIUM SERPL-SCNC: 138 MMOL/L (ref 135–147)
TRIGL SERPL-MCNC: 107 MG/DL (ref ?–150)
TSH SERPL DL<=0.05 MIU/L-ACNC: 1.32 UIU/ML (ref 0.45–4.5)
WBC # BLD AUTO: 5.97 THOUSAND/UL (ref 4.31–10.16)

## 2025-06-10 PROCEDURE — 85025 COMPLETE CBC W/AUTO DIFF WBC: CPT

## 2025-06-10 PROCEDURE — 36415 COLL VENOUS BLD VENIPUNCTURE: CPT

## 2025-06-10 PROCEDURE — 84443 ASSAY THYROID STIM HORMONE: CPT

## 2025-06-10 PROCEDURE — 80061 LIPID PANEL: CPT

## 2025-06-10 PROCEDURE — 80053 COMPREHEN METABOLIC PANEL: CPT

## 2025-07-01 DIAGNOSIS — F41.9 ANXIETY: ICD-10-CM

## 2025-07-01 DIAGNOSIS — G43.009 MIGRAINE WITHOUT AURA AND WITHOUT STATUS MIGRAINOSUS, NOT INTRACTABLE: ICD-10-CM

## 2025-07-01 DIAGNOSIS — F32.2 SEVERE MAJOR DEPRESSIVE DISORDER (HCC): ICD-10-CM

## 2025-07-02 ENCOUNTER — OFFICE VISIT (OUTPATIENT)
Dept: INTERNAL MEDICINE CLINIC | Facility: OTHER | Age: 20
End: 2025-07-02
Payer: COMMERCIAL

## 2025-07-02 VITALS
SYSTOLIC BLOOD PRESSURE: 114 MMHG | TEMPERATURE: 98.4 F | BODY MASS INDEX: 27.11 KG/M2 | OXYGEN SATURATION: 98 % | WEIGHT: 153 LBS | HEIGHT: 63 IN | DIASTOLIC BLOOD PRESSURE: 70 MMHG | HEART RATE: 105 BPM

## 2025-07-02 DIAGNOSIS — F32.2 SEVERE MAJOR DEPRESSIVE DISORDER (HCC): ICD-10-CM

## 2025-07-02 DIAGNOSIS — L70.0 ACNE VULGARIS: ICD-10-CM

## 2025-07-02 DIAGNOSIS — F41.9 ANXIETY: ICD-10-CM

## 2025-07-02 DIAGNOSIS — Z00.00 ANNUAL PHYSICAL EXAM: Primary | ICD-10-CM

## 2025-07-02 PROCEDURE — 99395 PREV VISIT EST AGE 18-39: CPT | Performed by: NURSE PRACTITIONER

## 2025-07-02 PROCEDURE — 99213 OFFICE O/P EST LOW 20 MIN: CPT | Performed by: NURSE PRACTITIONER

## 2025-07-02 RX ORDER — VENLAFAXINE HYDROCHLORIDE 37.5 MG/1
37.5 CAPSULE, EXTENDED RELEASE ORAL DAILY
Qty: 90 CAPSULE | Refills: 1 | Status: SHIPPED | OUTPATIENT
Start: 2025-07-02

## 2025-07-02 RX ORDER — TRETINOIN 0.25 MG/G
CREAM TOPICAL
Qty: 20 G | Refills: 1 | Status: SHIPPED | OUTPATIENT
Start: 2025-07-02

## 2025-07-02 RX ORDER — BUPROPION HYDROCHLORIDE 300 MG/1
300 TABLET ORAL EVERY MORNING
Qty: 90 TABLET | Refills: 1 | Status: SHIPPED | OUTPATIENT
Start: 2025-07-02

## 2025-07-02 NOTE — ASSESSMENT & PLAN NOTE
Depression Screening Follow-up Plan: Patient's depression screening was positive with a PHQ-9 score of 11. Patient with underlying depression and was advised to continue current medications as prescribed.  Increase wellbutrin XL to 300mg daily  Continue effexor 37.5mg daily  Follow up in 3 months, sooner as needed  Orders:    buPROPion (WELLBUTRIN XL) 300 mg 24 hr tablet; Take 1 tablet (300 mg total) by mouth every morning

## 2025-07-02 NOTE — PROGRESS NOTES
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 Patient with underlying depression and was advised to continue current medications as prescribed.  Increase wellbutrin XL to 300mg daily  Continue effexor 37.5mg daily  Follow up in 3 months, sooner as needed  Orders:    buPROPion (WELLBUTRIN XL) 300 mg 24 hr tablet; Take 1 tablet (300 mg total) by mouth every morning    Anxiety  Continue Effexor XR 37.5mg daily  Increase wellbutrin XL to 300mg daily   Continue atarax prn  Orders:    buPROPion (WELLBUTRIN XL) 300 mg 24 hr tablet; Take 1 tablet (300 mg total) by mouth every morning    Acne vulgaris  Start tretinoin as prescribed  Advised on good sunscreen use   Orders:    tretinoin (RETIN-A) 0.025 % cream; Apply topically daily at bedtime        Preventive Screenings:  - Diabetes Screening: screening up-to-date  - Cholesterol Screening: screening up-to-date   - Chlamydia Screening: patient declines   - Hepatitis C screening: screening up-to-date   - HIV screening: patient declines   - Cervical cancer screening: screening up-to-date   - Colon cancer screening: screening not indicated   - Lung cancer screening: screening not indicated     Immunizations:  - Immunizations due: Prevnar 20, Tdap and HPV (Gardasil 9)         History of Present Illness     Adult Annual Physical:  Patient  presents for annual physical. Patient presents today for annual physical.   She continues to work on weight loss, she states she fell off for awhile but is getting back on track with her diet and exercise regimen.   She states she has been very tired lately. Her depression is a little worse, anxiety is controlled..     Diet and Physical Activity:  - Diet/Nutrition: poor diet and well balanced diet.  - Exercise: walking, moderate cardiovascular exercise, strength training exercises, 3-4 times a week on average and 1-2 hours on average.    Depression Screening:    - PHQ-9 Score: 11    General Health:  - Sleep: sleeps poorly, 4-6 hours of sleep on average and unrefreshing sleep.  - Hearing: normal hearing bilateral ears.  - Vision: wears glasses, no vision problems and most recent eye exam > 1 year ago.  - Dental: regular dental visits, brushes teeth twice daily and does not floss.    /GYN Health:  - Follows with GYN: yes.   - Menopause: premenopausal.   - History of STDs: no  - Contraception: barrier methods.      Advanced Care Planning:  - Has an advanced directive?: no    - Has a durable medical POA?: no      Review of Systems   Constitutional:  Positive for fatigue. Negative for activity change, appetite change, chills, diaphoresis, fever and unexpected weight change.   Eyes:  Negative for visual disturbance.   Respiratory:  Negative for cough, chest tightness, shortness of breath and wheezing.    Cardiovascular:  Negative for chest pain.   Gastrointestinal:  Negative for abdominal distention, abdominal pain, blood in stool, constipation, diarrhea, nausea and vomiting.   Genitourinary:  Negative for difficulty urinating, dysuria, frequency, hematuria and urgency.   Neurological:  Positive for headaches. Negative for dizziness and light-headedness.   Psychiatric/Behavioral:  Positive for dysphoric mood. The patient is not nervous/anxious.      Medical History Reviewed by provider this encounter:     .  Past  "Medical History   Past Medical History[1]  Past Surgical History[2]  Family History[3]   reports that she has never smoked. She has never used smokeless tobacco. She reports that she does not currently use alcohol after a past usage of about 2.0 standard drinks of alcohol per week. She reports that she does not use drugs.  Current Outpatient Medications   Medication Instructions    albuterol (PROVENTIL HFA,VENTOLIN HFA) 90 mcg/act inhaler 2 puffs, Inhalation, Every 6 hours PRN    buPROPion (WELLBUTRIN XL) 300 mg, Oral, Every morning    cetirizine (ZYRTEC) 10 mg, Oral, Every evening    hydrOXYzine HCL (ATARAX) 10 mg, Oral, Every 6 hours PRN    hyoscyamine (ANASPAZ,LEVSIN) 0.125 mg, Oral, Every 4 hours PRN    omalizumab (XOLAIR) 150 mg, Subcutaneous, Every 28 days    ondansetron (ZOFRAN) 4 mg, Oral, Every 8 hours PRN    tretinoin (RETIN-A) 0.025 % cream Topical, Daily at bedtime    venlafaxine (EFFEXOR-XR) 37.5 mg, Oral, Daily    ZOLMitriptan (ZOMIG) 2.5 mg, Oral, Once as needed, may repeat after 2 hours, max 5mg per 24 hours   Allergies[4]   Medications Ordered Prior to Encounter[5]   Social History[6]    Objective   /70 (BP Location: Left arm, Patient Position: Sitting, Cuff Size: Adult)   Pulse 105   Temp 98.4 °F (36.9 °C) (Temporal)   Ht 5' 3\" (1.6 m)   Wt 69.4 kg (153 lb)   SpO2 98%   BMI 27.10 kg/m²     Physical Exam  Constitutional:       General: She is not in acute distress.     Appearance: Normal appearance. She is well-developed. She is not diaphoretic.   HENT:      Head: Normocephalic and atraumatic.      Right Ear: Tympanic membrane and external ear normal.      Left Ear: Tympanic membrane and external ear normal.      Nose: Nose normal. No mucosal edema, congestion or rhinorrhea.      Mouth/Throat:      Mouth: Mucous membranes are moist.      Pharynx: Oropharynx is clear. No oropharyngeal exudate or posterior oropharyngeal erythema.     Eyes:      Extraocular Movements: Extraocular movements " intact.      Conjunctiva/sclera: Conjunctivae normal.      Pupils: Pupils are equal, round, and reactive to light.     Neck:      Thyroid: No thyromegaly.     Cardiovascular:      Rate and Rhythm: Normal rate and regular rhythm.      Heart sounds: Normal heart sounds. No murmur heard.  Pulmonary:      Effort: Pulmonary effort is normal. No respiratory distress.      Breath sounds: Normal breath sounds. No decreased breath sounds, wheezing, rhonchi or rales.   Abdominal:      General: Abdomen is flat. Bowel sounds are normal. There is no distension.      Palpations: Abdomen is soft. There is no mass.      Tenderness: There is no abdominal tenderness. There is no guarding.     Musculoskeletal:         General: No tenderness. Normal range of motion.      Cervical back: Normal range of motion and neck supple. No edema.   Lymphadenopathy:      Cervical: No cervical adenopathy.      Upper Body:      Right upper body: No supraclavicular, axillary, pectoral or epitrochlear adenopathy.      Left upper body: No supraclavicular, axillary, pectoral or epitrochlear adenopathy.     Skin:     General: Skin is warm.      Findings: No rash.     Neurological:      Mental Status: She is alert and oriented to person, place, and time. Mental status is at baseline.      Motor: No weakness or abnormal muscle tone.      Gait: Gait normal.      Deep Tendon Reflexes: Reflexes are normal and symmetric.     Psychiatric:         Mood and Affect: Mood normal.         Behavior: Behavior normal.         Thought Content: Thought content normal.         Judgment: Judgment normal.                [1]   Past Medical History:  Diagnosis Date    Allergic     Seasonal    Anxiety     Asthma     Depression     Headache     History of chicken pox     Hives     Migraines     Pneumonia     Unsure of date    Wears glasses    [2]   Past Surgical History:  Procedure Laterality Date    WISDOM TOOTH EXTRACTION  06/02/2023   [3]   Family History  Problem Relation  Name Age of Onset    Arthritis Mother Joanna Berger         Diagnosed at 20    Migraines Mother Joanna Berger     Allergies Mother Joanna Berger     Hyperparathyroidism Father Tommie     Nephrolithiasis Father Tommie     No Known Problems Brother Omar     Asthma Maternal Grandmother Harika Farideh     Breast cancer Maternal Grandmother Harika Farideh     Sjogren's syndrome Maternal Grandmother Harika Farideh     Von Willebrand disease Maternal Grandmother Harika Farideh     Asthma Paternal Grandmother Yisel Berger     Autoimmune disease Paternal Grandmother Yisel Berger     Sjogren's syndrome Paternal Grandmother Yisel Irbyr     Fibromyalgia Paternal Grandmother Yisel Berger     Diabetes Paternal Aunt Veronica Berger         Pre-diabetic   [4]   Allergies  Allergen Reactions    Adhesive [Medical Tape] Rash   [5]   Current Outpatient Medications on File Prior to Visit   Medication Sig Dispense Refill    hydrOXYzine HCL (ATARAX) 10 mg tablet Take 1 tablet (10 mg total) by mouth every 6 (six) hours as needed for anxiety 30 tablet 1    hyoscyamine (ANASPAZ,LEVSIN) 0.125 MG tablet Take 1 tablet (0.125 mg total) by mouth every 4 (four) hours as needed for cramping 30 tablet 0    omalizumab (XOLAIR) subcutaneous injection Inject 1 mL (150 mg total) under the skin every 28 days      ondansetron (ZOFRAN) 4 mg tablet Take 1 tablet (4 mg total) by mouth every 8 (eight) hours as needed for nausea or vomiting 20 tablet 0    venlafaxine (EFFEXOR-XR) 37.5 mg 24 hr capsule TAKE 1 CAPSULE BY MOUTH EVERY DAY 90 capsule 1    ZOLMitriptan (ZOMIG) 2.5 MG tablet Take 1 tablet (2.5 mg total) by mouth once as needed for migraine may repeat after 2 hours, max 5mg per 24 hours 10 tablet 1    albuterol (PROVENTIL HFA,VENTOLIN HFA) 90 mcg/act inhaler Inhale 2 puffs every 6 (six) hours as needed for wheezing 8 g 2    cetirizine (ZyrTEC) 10 mg tablet TAKE 1 TABLET (10 MG TOTAL) BY MOUTH EVERY EVENING  DOSES 90 tablet 4    [DISCONTINUED]  buPROPion (WELLBUTRIN XL) 150 mg 24 hr tablet TAKE 1 TABLET BY MOUTH EVERY DAY IN THE MORNING 90 tablet 2    [DISCONTINUED] ondansetron (ZOFRAN) 4 mg tablet Take 1 tablet (4 mg total) by mouth every 8 (eight) hours as needed for nausea or vomiting (Patient not taking: Reported on 7/2/2025) 20 tablet 0    [DISCONTINUED] ondansetron (ZOFRAN) 4 mg tablet Take 1 tablet (4 mg total) by mouth every 8 (eight) hours as needed for nausea or vomiting for up to 7 doses (Patient not taking: Reported on 7/2/2025) 7 tablet 0    [DISCONTINUED] venlafaxine (EFFEXOR-XR) 37.5 mg 24 hr capsule TAKE 1 CAPSULE BY MOUTH EVERY DAY 90 capsule 1     No current facility-administered medications on file prior to visit.   [6]   Social History  Tobacco Use    Smoking status: Never    Smokeless tobacco: Never   Vaping Use    Vaping status: Never Used   Substance and Sexual Activity    Alcohol use: Not Currently     Alcohol/week: 2.0 standard drinks of alcohol     Types: 2 Standard drinks or equivalent per week     Comment: occasionally    Drug use: Never     Types: Marijuana    Sexual activity: Not Currently     Partners: Male     Birth control/protection: Condom Male, Other     Comment: Oral Contraceptive

## 2025-07-02 NOTE — PATIENT INSTRUCTIONS
"Patient Education     Routine physical for adults   The Basics   Written by the doctors and editors at [facility]   What is a physical? -- A physical is a routine visit, or \"check-up,\" with your doctor. You might also hear it called a \"wellness visit\" or \"preventive visit.\"  During each visit, the doctor will:   Ask about your physical and mental health   Ask about your habits, behaviors, and lifestyle   Do an exam   Give you vaccines if needed   Talk to you about any medicines you take   Give advice about your health   Answer your questions  Getting regular check-ups is an important part of taking care of your health. It can help your doctor find and treat any problems you have. But it's also important for preventing health problems.  A routine physical is different from a \"sick visit.\" A sick visit is when you see a doctor because of a health concern or problem. Since physicals are scheduled ahead of time, you can think about what you want to ask the doctor.  How often should I get a physical? -- It depends on your age and health. In general, for people age 21 years and older:   If you are younger than 50 years, you might be able to get a physical every 3 years.   If you are 50 years or older, your doctor might recommend a physical every year.  If you have an ongoing health condition, like diabetes or high blood pressure, your doctor will probably want to see you more often.  What happens during a physical? -- In general, each visit will include:   Physical exam - The doctor or nurse will check your height, weight, heart rate, and blood pressure. They will also look at your eyes and ears. They will ask about how you are feeling and whether you have any symptoms that bother you.   Medicines - It's a good idea to bring a list of all the medicines you take to each doctor visit. Your doctor will talk to you about your medicines and answer any questions. Tell them if you are having any side effects that bother you. You " "should also tell them if you are having trouble paying for any of your medicines.   Habits and behaviors - This includes:   Your diet   Your exercise habits   Whether you smoke, drink alcohol, or use drugs   Whether you are sexually active   Whether you feel safe at home  Your doctor will talk to you about things you can do to improve your health and lower your risk of health problems. They will also offer help and support. For example, if you want to quit smoking, they can give you advice and might prescribe medicines. If you want to improve your diet or get more physical activity, they can help you with this, too.   Lab tests, if needed - The tests you get will depend on your age and situation. For example, your doctor might want to check your:   Cholesterol   Blood sugar   Iron level   Vaccines - The recommended vaccines will depend on your age, health, and what vaccines you already had. Vaccines are very important because they can prevent certain serious or deadly infections.   Discussion of screening - \"Screening\" means checking for diseases or other health problems before they cause symptoms. Your doctor can recommend screening based on your age, risk, and preferences. This might include tests to check for:   Cancer, such as breast, prostate, cervical, ovarian, colorectal, prostate, lung, or skin cancer   Sexually transmitted infections, such as chlamydia and gonorrhea   Mental health conditions like depression and anxiety  Your doctor will talk to you about the different types of screening tests. They can help you decide which screenings to have. They can also explain what the results might mean.   Answering questions - The physical is a good time to ask the doctor or nurse questions about your health. If needed, they can refer you to other doctors or specialists, too.  Adults older than 65 years often need other care, too. As you get older, your doctor will talk to you about:   How to prevent falling at " home   Hearing or vision tests   Memory testing   How to take your medicines safely   Making sure that you have the help and support you need at home  All topics are updated as new evidence becomes available and our peer review process is complete.  This topic retrieved from [employer] on: May 02, 2024.  Topic 259329 Version 1.0  Release: 32.4.3 - C32.122  © 2024 UpToDate, Inc. and/or its affiliates. All rights reserved.  Consumer Information Use and Disclaimer   Disclaimer: This generalized information is a limited summary of diagnosis, treatment, and/or medication information. It is not meant to be comprehensive and should be used as a tool to help the user understand and/or assess potential diagnostic and treatment options. It does NOT include all information about conditions, treatments, medications, side effects, or risks that may apply to a specific patient. It is not intended to be medical advice or a substitute for the medical advice, diagnosis, or treatment of a health care provider based on the health care provider's examination and assessment of a patient's specific and unique circumstances. Patients must speak with a health care provider for complete information about their health, medical questions, and treatment options, including any risks or benefits regarding use of medications. This information does not endorse any treatments or medications as safe, effective, or approved for treating a specific patient. UpToDate, Inc. and its affiliates disclaim any warranty or liability relating to this information or the use thereof.The use of this information is governed by the Terms of Use, available at https://www.[employer]Alice Technologiesuwer.com/en/know/clinical-effectiveness-terms. 2024© UpToDate, Inc. and its affiliates and/or licensors. All rights reserved.  Copyright   © 2024 UpToDate, Inc. and/or its affiliates. All rights reserved.